# Patient Record
Sex: MALE | Race: WHITE | NOT HISPANIC OR LATINO | Employment: FULL TIME | URBAN - METROPOLITAN AREA
[De-identification: names, ages, dates, MRNs, and addresses within clinical notes are randomized per-mention and may not be internally consistent; named-entity substitution may affect disease eponyms.]

---

## 2019-10-23 ENCOUNTER — OFFICE VISIT (OUTPATIENT)
Dept: INTERNAL MEDICINE | Facility: CLINIC | Age: 24
End: 2019-10-23
Payer: COMMERCIAL

## 2019-10-23 VITALS
OXYGEN SATURATION: 97 % | DIASTOLIC BLOOD PRESSURE: 82 MMHG | BODY MASS INDEX: 22.92 KG/M2 | SYSTOLIC BLOOD PRESSURE: 134 MMHG | HEART RATE: 103 BPM | HEIGHT: 69 IN | TEMPERATURE: 98 F | WEIGHT: 154.75 LBS

## 2019-10-23 DIAGNOSIS — R10.9 ACUTE RIGHT FLANK PAIN: Primary | ICD-10-CM

## 2019-10-23 PROCEDURE — 99999 PR PBB SHADOW E&M-NEW PATIENT-LVL III: ICD-10-PCS | Mod: PBBFAC,,, | Performed by: FAMILY MEDICINE

## 2019-10-23 PROCEDURE — 99204 OFFICE O/P NEW MOD 45 MIN: CPT | Mod: S$GLB,,, | Performed by: FAMILY MEDICINE

## 2019-10-23 PROCEDURE — 99204 PR OFFICE/OUTPT VISIT, NEW, LEVL IV, 45-59 MIN: ICD-10-PCS | Mod: S$GLB,,, | Performed by: FAMILY MEDICINE

## 2019-10-23 PROCEDURE — 99999 PR PBB SHADOW E&M-NEW PATIENT-LVL III: CPT | Mod: PBBFAC,,, | Performed by: FAMILY MEDICINE

## 2019-10-23 RX ORDER — HYDROCODONE BITARTRATE AND ACETAMINOPHEN 10; 325 MG/1; MG/1
1 TABLET ORAL
COMMUNITY
End: 2019-10-23

## 2019-10-23 RX ORDER — IBUPROFEN 800 MG/1
800 TABLET ORAL 3 TIMES DAILY
Qty: 30 TABLET | Refills: 0 | Status: SHIPPED | OUTPATIENT
Start: 2019-10-23 | End: 2019-11-02

## 2019-10-23 RX ORDER — CYCLOBENZAPRINE HCL 10 MG
10 TABLET ORAL 3 TIMES DAILY
Qty: 30 TABLET | Refills: 0 | Status: SHIPPED | OUTPATIENT
Start: 2019-10-23 | End: 2019-11-06

## 2019-10-23 NOTE — PROGRESS NOTES
Subjective:   Patient ID:  Be Brito is a 24 y.o. male.    Chief Complaint:  Low-back Pain    History reviewed. No pertinent past medical history.     Past Surgical History:   Procedure Laterality Date    TONSILLECTOMY       Family History   Problem Relation Age of Onset    Lung cancer Mother     Leukemia Father     Osteoporosis Father     Arthritis Father     Colon cancer Neg Hx     Prostate cancer Neg Hx      Review of patient's allergies indicates:  No Known Allergies    Current Outpatient Medications:     cyclobenzaprine (FLEXERIL) 10 MG tablet, Take 1 tablet (10 mg total) by mouth 3 (three) times daily., Disp: 30 tablet, Rfl: 0    ibuprofen (ADVIL,MOTRIN) 800 MG tablet, Take 1 tablet (800 mg total) by mouth 3 (three) times daily. for 10 days, Disp: 30 tablet, Rfl: 0     Flank Pain   This is a new problem. The current episode started in the past 7 days. The problem occurs constantly. The problem has been gradually improving since onset. The pain is present in the costovertebral angle. The quality of the pain is described as cramping, shooting and stabbing. The pain does not radiate. Pain scale: Initial 9/10, now 4/10. The pain is mild. The pain is the same all the time. The symptoms are aggravated by bending, position, lying down and twisting. Stiffness is present: None. Pertinent negatives include no abdominal pain, bladder incontinence, bowel incontinence, chest pain, dysuria, fever, headaches, leg pain, numbness, paresis, paresthesias, pelvic pain, perianal numbness, tingling, weakness or weight loss. Risk factors: None. He has tried bed rest and home exercises (Tylenol) for the symptoms. The treatment provided mild relief.     Review of Systems   Constitutional: Negative for chills, fatigue, fever and weight loss.   Cardiovascular: Negative for chest pain, palpitations and leg swelling.   Gastrointestinal: Negative for abdominal pain, bowel incontinence, constipation, diarrhea, nausea and  "vomiting.   Genitourinary: Positive for flank pain. Negative for bladder incontinence, decreased urine volume, difficulty urinating, dysuria, frequency, hematuria, pelvic pain and urgency.   Musculoskeletal: Positive for back pain and myalgias. Negative for arthralgias, gait problem, joint swelling, neck pain and neck stiffness.   Skin: Negative for rash.   Neurological: Negative for dizziness, tingling, tremors, syncope, weakness, light-headedness, numbness, headaches and paresthesias.   Psychiatric/Behavioral: Negative for sleep disturbance. The patient is not nervous/anxious.        Objective:   /82 (BP Location: Right arm, Patient Position: Sitting, BP Method: Medium (Manual))   Pulse 103   Temp 97.7 °F (36.5 °C) (Tympanic)   Ht 5' 9" (1.753 m)   Wt 70.2 kg (154 lb 12.2 oz)   SpO2 97%   BMI 22.85 kg/m²     Physical Exam   Constitutional: Vital signs are normal. He appears well-developed and well-nourished. No distress.   Eyes: No scleral icterus.   Neck: Normal range of motion and full passive range of motion without pain. Neck supple.   Abdominal: Soft. He exhibits no distension. There is no tenderness. There is no rebound, no guarding and no CVA tenderness.   Musculoskeletal: He exhibits no edema.        Lumbar back: He exhibits pain and spasm. He exhibits normal range of motion, no tenderness, no bony tenderness, no swelling, no edema, no deformity and no laceration.   Full range of motion all fields.    Decreased speed due to pain.    No midline tenderness.    Pain located right flank area but above iliac crest.    Pain increased with palpation.  Pain greatest with lateral flexion and lateral rotation movements.   Neurological: He has normal reflexes. He displays a negative Romberg sign. Coordination and gait normal.   Straight leg raise negative x2   Skin: Skin is warm, dry and intact. No rash noted.   Psychiatric: He has a normal mood and affect.   Nursing note and vitals " reviewed.    Assessment:     1. Acute right flank pain      Plan:   Acute right flank pain  -     cyclobenzaprine (FLEXERIL) 10 MG tablet; Take 1 tablet (10 mg total) by mouth 3 (three) times daily.  Dispense: 30 tablet; Refill: 0  -     ibuprofen (ADVIL,MOTRIN) 800 MG tablet; Take 1 tablet (800 mg total) by mouth 3 (three) times daily. for 10 days  Dispense: 30 tablet; Refill: 0  -     Urinalysis; Future; Expected date: 10/23/2019    Check urinalysis.  If hematuria present, abdominal/pelvic CT stone protocol.    Most like soft tissue/muscle related.    Motrin inter mg 3 times a day  Flexeril 10 mg 3 times   Today than nightly on days that works.  Alternate ice or heat to affected area.    Avoid trigger activities.    Expect significant improvement in 47 terms with treatment.    If no improvement, call for x-rays and physical therapy.    Otherwise follow-up 2 weeks for annual physical exam.

## 2019-10-24 ENCOUNTER — TELEPHONE (OUTPATIENT)
Dept: INTERNAL MEDICINE | Facility: CLINIC | Age: 24
End: 2019-10-24

## 2019-10-24 NOTE — TELEPHONE ENCOUNTER
----- Message from Moises Pathak MD sent at 10/24/2019 11:49 AM CDT -----  Urinalysis normal.    Occult blood on urine dipstick only significant for 1+ or greater.  Would not order CT for kidney stones at this time.    If pain worsens or does not improve with treatment for musculoskeletal source, would then order CT abdomen pelvis with stone protocol.

## 2019-10-25 ENCOUNTER — TELEPHONE (OUTPATIENT)
Dept: INTERNAL MEDICINE | Facility: CLINIC | Age: 24
End: 2019-10-25

## 2019-10-25 NOTE — TELEPHONE ENCOUNTER
----- Message from Feliciano Jamil sent at 10/24/2019  4:39 PM CDT -----  Contact: pt   .Type:  Patient Returning Call    Who Called: pt   Who Left Message for Patient: nurse   Does the patient know what this is regarding?yes test results   Would the patient rather a call back or a response via [facility]? #callback   Best Call Back Number: ..[phone]    Additional Information:

## 2019-11-06 ENCOUNTER — LAB VISIT (OUTPATIENT)
Dept: LAB | Facility: HOSPITAL | Age: 24
End: 2019-11-06
Attending: FAMILY MEDICINE
Payer: COMMERCIAL

## 2019-11-06 ENCOUNTER — OFFICE VISIT (OUTPATIENT)
Dept: INTERNAL MEDICINE | Facility: CLINIC | Age: 24
End: 2019-11-06
Payer: COMMERCIAL

## 2019-11-06 VITALS
HEART RATE: 110 BPM | WEIGHT: 156.06 LBS | TEMPERATURE: 98 F | SYSTOLIC BLOOD PRESSURE: 122 MMHG | OXYGEN SATURATION: 97 % | BODY MASS INDEX: 23.12 KG/M2 | HEIGHT: 69 IN | DIASTOLIC BLOOD PRESSURE: 88 MMHG

## 2019-11-06 DIAGNOSIS — Z00.00 ANNUAL PHYSICAL EXAM: ICD-10-CM

## 2019-11-06 DIAGNOSIS — Z11.3 SCREENING FOR STDS (SEXUALLY TRANSMITTED DISEASES): ICD-10-CM

## 2019-11-06 DIAGNOSIS — Z00.00 ANNUAL PHYSICAL EXAM: Primary | ICD-10-CM

## 2019-11-06 LAB
ALBUMIN SERPL BCP-MCNC: 4.3 G/DL (ref 3.5–5.2)
ALP SERPL-CCNC: 63 U/L (ref 55–135)
ALT SERPL W/O P-5'-P-CCNC: 9 U/L (ref 10–44)
ANION GAP SERPL CALC-SCNC: 9 MMOL/L (ref 8–16)
AST SERPL-CCNC: 16 U/L (ref 10–40)
BILIRUB SERPL-MCNC: 0.8 MG/DL (ref 0.1–1)
BUN SERPL-MCNC: 13 MG/DL (ref 6–20)
CALCIUM SERPL-MCNC: 9.7 MG/DL (ref 8.7–10.5)
CHLORIDE SERPL-SCNC: 104 MMOL/L (ref 95–110)
CHOLEST SERPL-MCNC: 161 MG/DL (ref 120–199)
CHOLEST/HDLC SERPL: 2.5 {RATIO} (ref 2–5)
CO2 SERPL-SCNC: 28 MMOL/L (ref 23–29)
CREAT SERPL-MCNC: 0.9 MG/DL (ref 0.5–1.4)
ERYTHROCYTE [DISTWIDTH] IN BLOOD BY AUTOMATED COUNT: 12.1 % (ref 11.5–14.5)
EST. GFR  (AFRICAN AMERICAN): >60 ML/MIN/1.73 M^2
EST. GFR  (NON AFRICAN AMERICAN): >60 ML/MIN/1.73 M^2
GLUCOSE SERPL-MCNC: 81 MG/DL (ref 70–110)
HCT VFR BLD AUTO: 43.4 % (ref 40–54)
HDLC SERPL-MCNC: 65 MG/DL (ref 40–75)
HDLC SERPL: 40.4 % (ref 20–50)
HGB BLD-MCNC: 13.8 G/DL (ref 14–18)
LDLC SERPL CALC-MCNC: 89 MG/DL (ref 63–159)
MCH RBC QN AUTO: 30.7 PG (ref 27–31)
MCHC RBC AUTO-ENTMCNC: 31.8 G/DL (ref 32–36)
MCV RBC AUTO: 96 FL (ref 82–98)
NONHDLC SERPL-MCNC: 96 MG/DL
PLATELET # BLD AUTO: 299 K/UL (ref 150–350)
PMV BLD AUTO: 9.8 FL (ref 9.2–12.9)
POTASSIUM SERPL-SCNC: 4.4 MMOL/L (ref 3.5–5.1)
PROT SERPL-MCNC: 7.7 G/DL (ref 6–8.4)
RBC # BLD AUTO: 4.5 M/UL (ref 4.6–6.2)
SODIUM SERPL-SCNC: 141 MMOL/L (ref 136–145)
TRIGL SERPL-MCNC: 35 MG/DL (ref 30–150)
TSH SERPL DL<=0.005 MIU/L-ACNC: 0.82 UIU/ML (ref 0.4–4)
WBC # BLD AUTO: 4.64 K/UL (ref 3.9–12.7)

## 2019-11-06 PROCEDURE — 99999 PR PBB SHADOW E&M-EST. PATIENT-LVL III: CPT | Mod: PBBFAC,,, | Performed by: FAMILY MEDICINE

## 2019-11-06 PROCEDURE — 80061 LIPID PANEL: CPT

## 2019-11-06 PROCEDURE — 85027 COMPLETE CBC AUTOMATED: CPT

## 2019-11-06 PROCEDURE — 87340 HEPATITIS B SURFACE AG IA: CPT

## 2019-11-06 PROCEDURE — 84443 ASSAY THYROID STIM HORMONE: CPT

## 2019-11-06 PROCEDURE — 86803 HEPATITIS C AB TEST: CPT

## 2019-11-06 PROCEDURE — 99999 PR PBB SHADOW E&M-EST. PATIENT-LVL III: ICD-10-PCS | Mod: PBBFAC,,, | Performed by: FAMILY MEDICINE

## 2019-11-06 PROCEDURE — 86703 HIV-1/HIV-2 1 RESULT ANTBDY: CPT

## 2019-11-06 PROCEDURE — 99395 PR PREVENTIVE VISIT,EST,18-39: ICD-10-PCS | Mod: S$GLB,,, | Performed by: FAMILY MEDICINE

## 2019-11-06 PROCEDURE — 80053 COMPREHEN METABOLIC PANEL: CPT

## 2019-11-06 PROCEDURE — 99395 PREV VISIT EST AGE 18-39: CPT | Mod: S$GLB,,, | Performed by: FAMILY MEDICINE

## 2019-11-06 PROCEDURE — 86592 SYPHILIS TEST NON-TREP QUAL: CPT

## 2019-11-06 PROCEDURE — 86706 HEP B SURFACE ANTIBODY: CPT

## 2019-11-06 PROCEDURE — 36415 COLL VENOUS BLD VENIPUNCTURE: CPT

## 2019-11-06 NOTE — PROGRESS NOTES
"Subjective:   Patient ID: Be Brito is a 24 y.o. male.  Chief Complaint:  Follow-up      Patient presents for annual physical exam   Most recent visit for low back pain, leg cramps, muscle spasms.  Urinalysis normal.  Responded well to Motrin and Flexeril.  Pain-free today.    No family history colon or prostate cancer  No active  symptoms   Unknown last tetanus booster and flu vaccine   Smoker.  Not interest in cessation.  No previous pneumonia vaccine.  Request STD screening.  Denies previous exposure, diagnosis, or treatment.  No active symptoms.    No new complaints or concerns today.    Review of Systems   Constitutional: Negative for chills, fatigue and fever.   HENT: Negative for congestion, ear pain, postnasal drip, rhinorrhea, sinus pressure and sore throat.    Eyes: Negative for visual disturbance.   Respiratory: Negative for cough, chest tightness, shortness of breath and wheezing.    Cardiovascular: Negative for chest pain, palpitations and leg swelling.   Gastrointestinal: Negative for abdominal pain, constipation, diarrhea, nausea and vomiting.   Endocrine: Negative for polydipsia, polyphagia and polyuria.   Genitourinary: Negative for difficulty urinating, dysuria, flank pain, frequency, hematuria and urgency.   Musculoskeletal: Positive for back pain and myalgias.   Skin: Negative for rash.   Neurological: Negative for dizziness, weakness, light-headedness and headaches.   Hematological: Negative for adenopathy.   Psychiatric/Behavioral: Negative for sleep disturbance. The patient is not nervous/anxious.      Objective:   /88 (BP Location: Left arm, Patient Position: Sitting, BP Method: Medium (Manual))   Pulse 110   Temp 97.7 °F (36.5 °C) (Tympanic)   Ht 5' 9" (1.753 m)   Wt 70.8 kg (156 lb 1.4 oz)   SpO2 97%   BMI 23.05 kg/m²     Physical Exam   Constitutional: He is oriented to person, place, and time. Vital signs are normal. He appears well-developed and well-nourished. "   HENT:   Right Ear: Hearing, tympanic membrane, external ear and ear canal normal.   Left Ear: Hearing, tympanic membrane, external ear and ear canal normal.   Nose: Nose normal. Right sinus exhibits no maxillary sinus tenderness and no frontal sinus tenderness. Left sinus exhibits no maxillary sinus tenderness and no frontal sinus tenderness.   Mouth/Throat: Uvula is midline, oropharynx is clear and moist and mucous membranes are normal.   Eyes: Conjunctivae are normal. Right eye exhibits no discharge. Left eye exhibits no discharge. Right conjunctiva is not injected. Left conjunctiva is not injected. No scleral icterus.   Neck: No JVD present. No thyroid mass and no thyromegaly present.   Cardiovascular: Normal rate, regular rhythm, normal heart sounds and intact distal pulses. Exam reveals no gallop and no friction rub.   No murmur heard.  Pulses:       Radial pulses are 2+ on the right side, and 2+ on the left side.   Pulmonary/Chest: Effort normal and breath sounds normal. He has no wheezes. He has no rhonchi. He has no rales.   Abdominal: Soft. He exhibits no distension. There is no tenderness. There is no rebound, no guarding and no CVA tenderness.   Musculoskeletal: He exhibits no edema.        Lumbar back: Normal. He exhibits normal range of motion, no bony tenderness, no pain and no spasm.   Lymphadenopathy:     He has no cervical adenopathy.   Neurological: He is alert and oriented to person, place, and time.   Skin: Skin is warm and dry. No rash noted.   Psychiatric: He has a normal mood and affect.   Nursing note and vitals reviewed.    Assessment:       ICD-10-CM ICD-9-CM   1. Annual physical exam Z00.00 V70.0   2. Screening for STDs (sexually transmitted diseases) Z11.3 V74.5     Plan:   Annual physical exam  Screening for STDs (sexually transmitted diseases)  -     CBC Without Differential; Future; Expected date: 11/06/2019  -     Comprehensive metabolic panel; Future; Expected date: 11/06/2019  -      Lipid panel; Future; Expected date: 11/06/2019  -     TSH; Future; Expected date: 11/06/2019  -     HIV 1/2 Ag/Ab (4th Gen); Future; Expected date: 11/06/2019  -     RPR; Future; Expected date: 11/06/2019  -     Hepatitis B surface antigen; Future; Expected date: 11/06/2019  -     Hepatitis B surface antibody; Future; Expected date: 11/06/2019  -     Hepatitis C antibody; Future; Expected date: 11/06/2019  -     C. trachomatis/N. gonorrhoeae by AMP DNA; Future; Expected date: 11/06/2019    Blood pressure normal.  BMI 23.  Overall exam stable.    Check labs.  Treat as indicated.    Declines tetanus, shingles, and pneumonia vaccines.    Not interested in smoking cessation  Continue Motrin and/or Flexeril as needed for back pain.    Stay hydrated to avoid muscle cramps  If becomes more symptomatic, needs lumbar sacral x-ray.    Return to clinic 1 year for annual physical exam or sooner as needed.

## 2019-11-07 ENCOUNTER — TELEPHONE (OUTPATIENT)
Dept: INTERNAL MEDICINE | Facility: CLINIC | Age: 24
End: 2019-11-07

## 2019-11-07 LAB
HBV SURFACE AB SER-ACNC: NEGATIVE M[IU]/ML
HBV SURFACE AG SERPL QL IA: NEGATIVE
HCV AB SERPL QL IA: NEGATIVE
HIV 1+2 AB+HIV1 P24 AG SERPL QL IA: NEGATIVE
RPR SER QL: NORMAL

## 2019-11-07 NOTE — TELEPHONE ENCOUNTER
----- Message from Moises Pathak MD sent at 11/7/2019  8:23 AM CST -----  CBC test shows a mild anemia. Take a daily multivitamin with iron.     Sugar, Kidney, Liver, and Electrolyte tests are all normal.  Cholesterol tests are normal. Your 10-year risk of a heart disease or stroke is low. Aspirin daily is not recommended. A statin cholesterol medication is not recommended.  Thyroid testing is normal.  STD tests pending  Recheck labs 1 year

## 2020-12-07 ENCOUNTER — TELEPHONE (OUTPATIENT)
Dept: INTERNAL MEDICINE | Facility: CLINIC | Age: 25
End: 2020-12-07

## 2020-12-07 NOTE — TELEPHONE ENCOUNTER
I called and informed the pt that the provider has called out sick and I assisted him with rescheduling . He verbalized understanding. //kah

## 2021-03-25 ENCOUNTER — PATIENT MESSAGE (OUTPATIENT)
Dept: ADMINISTRATIVE | Facility: HOSPITAL | Age: 26
End: 2021-03-25

## 2022-04-27 ENCOUNTER — PATIENT MESSAGE (OUTPATIENT)
Dept: ADMINISTRATIVE | Facility: HOSPITAL | Age: 27
End: 2022-04-27
Payer: COMMERCIAL

## 2022-07-11 ENCOUNTER — LAB VISIT (OUTPATIENT)
Dept: LAB | Facility: HOSPITAL | Age: 27
End: 2022-07-11
Attending: FAMILY MEDICINE
Payer: COMMERCIAL

## 2022-07-11 ENCOUNTER — OFFICE VISIT (OUTPATIENT)
Dept: INTERNAL MEDICINE | Facility: CLINIC | Age: 27
End: 2022-07-11
Payer: COMMERCIAL

## 2022-07-11 VITALS
WEIGHT: 154.88 LBS | HEIGHT: 69 IN | SYSTOLIC BLOOD PRESSURE: 128 MMHG | DIASTOLIC BLOOD PRESSURE: 84 MMHG | HEART RATE: 86 BPM | TEMPERATURE: 99 F | OXYGEN SATURATION: 98 % | BODY MASS INDEX: 22.94 KG/M2

## 2022-07-11 DIAGNOSIS — M25.50 ARTHRALGIA, UNSPECIFIED JOINT: Primary | ICD-10-CM

## 2022-07-11 DIAGNOSIS — M25.50 ARTHRALGIA, UNSPECIFIED JOINT: ICD-10-CM

## 2022-07-11 LAB
ALBUMIN SERPL BCP-MCNC: 4.1 G/DL (ref 3.5–5.2)
ALP SERPL-CCNC: 55 U/L (ref 55–135)
ALT SERPL W/O P-5'-P-CCNC: 10 U/L (ref 10–44)
ANION GAP SERPL CALC-SCNC: 11 MMOL/L (ref 8–16)
AST SERPL-CCNC: 16 U/L (ref 10–40)
BILIRUB SERPL-MCNC: 1.1 MG/DL (ref 0.1–1)
BUN SERPL-MCNC: 11 MG/DL (ref 6–20)
CALCIUM SERPL-MCNC: 9.7 MG/DL (ref 8.7–10.5)
CHLORIDE SERPL-SCNC: 104 MMOL/L (ref 95–110)
CO2 SERPL-SCNC: 24 MMOL/L (ref 23–29)
CREAT SERPL-MCNC: 1 MG/DL (ref 0.5–1.4)
ERYTHROCYTE [SEDIMENTATION RATE] IN BLOOD BY WESTERGREN METHOD: 24 MM/HR (ref 0–10)
EST. GFR  (AFRICAN AMERICAN): >60 ML/MIN/1.73 M^2
EST. GFR  (NON AFRICAN AMERICAN): >60 ML/MIN/1.73 M^2
GLUCOSE SERPL-MCNC: 97 MG/DL (ref 70–110)
POTASSIUM SERPL-SCNC: 4.4 MMOL/L (ref 3.5–5.1)
PROT SERPL-MCNC: 7.2 G/DL (ref 6–8.4)
RHEUMATOID FACT SERPL-ACNC: 340 IU/ML (ref 0–15)
SODIUM SERPL-SCNC: 139 MMOL/L (ref 136–145)

## 2022-07-11 PROCEDURE — 1159F PR MEDICATION LIST DOCUMENTED IN MEDICAL RECORD: ICD-10-PCS | Mod: CPTII,S$GLB,, | Performed by: FAMILY MEDICINE

## 2022-07-11 PROCEDURE — 3074F SYST BP LT 130 MM HG: CPT | Mod: CPTII,S$GLB,, | Performed by: FAMILY MEDICINE

## 2022-07-11 PROCEDURE — 3079F DIAST BP 80-89 MM HG: CPT | Mod: CPTII,S$GLB,, | Performed by: FAMILY MEDICINE

## 2022-07-11 PROCEDURE — 86431 RHEUMATOID FACTOR QUANT: CPT | Performed by: FAMILY MEDICINE

## 2022-07-11 PROCEDURE — 85027 COMPLETE CBC AUTOMATED: CPT | Performed by: FAMILY MEDICINE

## 2022-07-11 PROCEDURE — 1160F PR REVIEW ALL MEDS BY PRESCRIBER/CLIN PHARMACIST DOCUMENTED: ICD-10-PCS | Mod: CPTII,S$GLB,, | Performed by: FAMILY MEDICINE

## 2022-07-11 PROCEDURE — 99999 PR PBB SHADOW E&M-EST. PATIENT-LVL III: CPT | Mod: PBBFAC,,, | Performed by: FAMILY MEDICINE

## 2022-07-11 PROCEDURE — 3008F PR BODY MASS INDEX (BMI) DOCUMENTED: ICD-10-PCS | Mod: CPTII,S$GLB,, | Performed by: FAMILY MEDICINE

## 2022-07-11 PROCEDURE — 1159F MED LIST DOCD IN RCRD: CPT | Mod: CPTII,S$GLB,, | Performed by: FAMILY MEDICINE

## 2022-07-11 PROCEDURE — 3074F PR MOST RECENT SYSTOLIC BLOOD PRESSURE < 130 MM HG: ICD-10-PCS | Mod: CPTII,S$GLB,, | Performed by: FAMILY MEDICINE

## 2022-07-11 PROCEDURE — 99214 PR OFFICE/OUTPT VISIT, EST, LEVL IV, 30-39 MIN: ICD-10-PCS | Mod: S$GLB,,, | Performed by: FAMILY MEDICINE

## 2022-07-11 PROCEDURE — 82550 ASSAY OF CK (CPK): CPT | Performed by: FAMILY MEDICINE

## 2022-07-11 PROCEDURE — 99214 OFFICE O/P EST MOD 30 MIN: CPT | Mod: S$GLB,,, | Performed by: FAMILY MEDICINE

## 2022-07-11 PROCEDURE — 1160F RVW MEDS BY RX/DR IN RCRD: CPT | Mod: CPTII,S$GLB,, | Performed by: FAMILY MEDICINE

## 2022-07-11 PROCEDURE — 80053 COMPREHEN METABOLIC PANEL: CPT | Performed by: FAMILY MEDICINE

## 2022-07-11 PROCEDURE — 99999 PR PBB SHADOW E&M-EST. PATIENT-LVL III: ICD-10-PCS | Mod: PBBFAC,,, | Performed by: FAMILY MEDICINE

## 2022-07-11 PROCEDURE — 86038 ANTINUCLEAR ANTIBODIES: CPT | Performed by: FAMILY MEDICINE

## 2022-07-11 PROCEDURE — 3079F PR MOST RECENT DIASTOLIC BLOOD PRESSURE 80-89 MM HG: ICD-10-PCS | Mod: CPTII,S$GLB,, | Performed by: FAMILY MEDICINE

## 2022-07-11 PROCEDURE — 3008F BODY MASS INDEX DOCD: CPT | Mod: CPTII,S$GLB,, | Performed by: FAMILY MEDICINE

## 2022-07-11 PROCEDURE — 36415 COLL VENOUS BLD VENIPUNCTURE: CPT | Performed by: FAMILY MEDICINE

## 2022-07-11 PROCEDURE — 85651 RBC SED RATE NONAUTOMATED: CPT | Performed by: FAMILY MEDICINE

## 2022-07-11 NOTE — PROGRESS NOTES
Be Brito  07/11/2022  [id number]    Moises Pathak MD  Patient Care Team:  Moises Pathak MD as PCP - General (Family Medicine)          Visit Type:an urgent visit for a new problem    Chief Complaint:  Chief Complaint   Patient presents with    Annual Exam     Joint and muscle pain for awhile now       History of Present Illness:  This patient is new to me  PCP listed as Moises Pathak at the [place].    He comes in today with concerns of soreness and swelling.  He has concerns with joint pain for while  He reports it feels migratory. He can report move to shoulder, and had joint.  Feels a muscle burning. He feels that the joint can get swollen and hot and red.       Last Check up 2019.  C/o Back pain. Responded with motrin and flexeril    No recent labs.    Smoker- not interested in quitting at this time.    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  Reports that the issues are 7 yrs ongoing    He is reporting now that even stretching and getting going isn't helping the pain.         History:  History reviewed. No pertinent past medical history.  Past Surgical History:   Procedure Laterality Date    TONSILLECTOMY      trigger finger injection       Family History   Problem Relation Age of Onset    Lung cancer Mother     Leukemia Father     Osteoporosis Father     Arthritis Father     Colon cancer Neg Hx     Prostate cancer Neg Hx      Social History     Socioeconomic History    Marital status: Single    Number of children: 0   Tobacco Use    Smoking status: Current Every Day Smoker     Packs/day: 1.00     Types: Cigarettes    Smokeless tobacco: Current User   Substance and Sexual Activity    Alcohol use: Yes     Alcohol/week: 25.0 standard drinks     Types: 25 Cans of beer per week    Drug use: Never     There is no problem list on file for this patient.    Review of patient's allergies indicates:  No Known Allergies    The following were reviewed at this visit: active problem list,  medication list, allergies, family history, social history, and health maintenance.    Medications:  Current Outpatient Medications on File Prior to Visit   Medication Sig Dispense Refill    acetaminophen (TYLENOL ORAL) Take by mouth.       No current facility-administered medications on file prior to visit.       Medications have been reviewed and reconciled with patient at this visit.  Barriers to medications reviewed with patient.    Adverse reactions to current medications reviewed with patient..    Over the counter medications reviewed and reconciled with patient.    Exam:  Wt Readings from Last 3 Encounters:   07/11/22 70.2 kg (154 lb 14 oz)   11/06/19 70.8 kg (156 lb 1.4 oz)   10/23/19 70.2 kg (154 lb 12.2 oz)     Temp Readings from Last 3 Encounters:   07/11/22 98.8 °F (37.1 °C) (Temporal)   11/06/19 97.7 °F (36.5 °C) (Tympanic)   10/23/19 97.7 °F (36.5 °C) (Tympanic)     BP Readings from Last 3 Encounters:   07/11/22 128/84   11/06/19 122/88   10/23/19 134/82     Pulse Readings from Last 3 Encounters:   07/11/22 86   11/06/19 110   10/23/19 103     Body mass index is 22.87 kg/m².      Review of Systems   Constitutional: Negative.  Negative for chills and fever.   HENT: Negative.  Negative for congestion, sinus pain and sore throat.    Eyes: Negative for blurred vision and double vision.   Respiratory: Negative for cough, sputum production, shortness of breath and wheezing.    Cardiovascular: Negative for chest pain, palpitations and leg swelling.   Gastrointestinal: Negative for abdominal pain, constipation, diarrhea, heartburn, nausea and vomiting.   Genitourinary: Negative.    Musculoskeletal: Positive for joint pain.   Skin: Negative.  Negative for rash.   Neurological: Negative.    Endo/Heme/Allergies: Negative.  Negative for polydipsia. Does not bruise/bleed easily.   Psychiatric/Behavioral: Negative for depression and substance abuse.     Physical Exam  Nursing note reviewed.   Cardiovascular:       Rate and Rhythm: Normal rate and regular rhythm.   Pulmonary:      Effort: Pulmonary effort is normal. No respiratory distress.   Musculoskeletal:      Comments: Right bicep  Right  finger joint, pain, worse with     No skin changes.  Doesn't appear red     Neurological:      Mental Status: He is alert and oriented to person, place, and time.   Psychiatric:         Mood and Affect: Mood normal.         Behavior: Behavior normal.         Thought Content: Thought content normal.         Judgment: Judgment normal.         Laboratory Reviewed ({Yes)  Lab Results   Component Value Date    WBC 4.64 11/06/2019    HGB 13.8 (L) 11/06/2019    HCT 43.4 11/06/2019     11/06/2019    CHOL 161 11/06/2019    TRIG 35 11/06/2019    HDL 65 11/06/2019    ALT 9 (L) 11/06/2019    AST 16 11/06/2019     11/06/2019    K 4.4 11/06/2019     11/06/2019    CREATININE 0.9 11/06/2019    BUN 13 11/06/2019    CO2 28 11/06/2019    TSH 0.824 11/06/2019       Be was seen today for annual exam.    Diagnoses and all orders for this visit:    Arthralgia, unspecified joint  -     Comprehensive Metabolic Panel; Future  -     CBC Without Differential; Future  -     Sedimentation rate; Future  -     Rheumatoid Factor; Future  -     TARIQ Screen w/Reflex; Future  -     CK; Future  -     CBC Auto Differential; Future      Check labs today    May need rheum follow up            Care Plan/Goals: Reviewed    Goals    None         Follow up: No follow-ups on file.    After visit summary was printed and given to patient upon discharge today.  Patient goals and care plan are included in After Visit Summary.

## 2022-07-12 DIAGNOSIS — M06.9 RHEUMATOID ARTHRITIS, INVOLVING UNSPECIFIED SITE, UNSPECIFIED WHETHER RHEUMATOID FACTOR PRESENT: Primary | ICD-10-CM

## 2022-07-12 LAB
ANA SER QL IF: NORMAL
CK SERPL-CCNC: 60 U/L (ref 20–200)
ERYTHROCYTE [DISTWIDTH] IN BLOOD BY AUTOMATED COUNT: 12.7 % (ref 11.5–14.5)
HCT VFR BLD AUTO: 44.1 % (ref 40–54)
HGB BLD-MCNC: 14.4 G/DL (ref 14–18)
MCH RBC QN AUTO: 31.2 PG (ref 27–31)
MCHC RBC AUTO-ENTMCNC: 32.7 G/DL (ref 32–36)
MCV RBC AUTO: 96 FL (ref 82–98)
PLATELET # BLD AUTO: 272 K/UL (ref 150–450)
PMV BLD AUTO: 9.9 FL (ref 9.2–12.9)
RBC # BLD AUTO: 4.61 M/UL (ref 4.6–6.2)
WBC # BLD AUTO: 5.83 K/UL (ref 3.9–12.7)

## 2022-07-21 ENCOUNTER — HOSPITAL ENCOUNTER (OUTPATIENT)
Dept: RADIOLOGY | Facility: HOSPITAL | Age: 27
Discharge: HOME | End: 2022-07-21
Payer: COMMERCIAL

## 2022-07-21 ENCOUNTER — OFFICE VISIT (OUTPATIENT)
Dept: RHEUMATOLOGY | Facility: CLINIC | Age: 27
End: 2022-07-21
Payer: COMMERCIAL

## 2022-07-21 ENCOUNTER — PATIENT MESSAGE (OUTPATIENT)
Dept: RHEUMATOLOGY | Facility: CLINIC | Age: 27
End: 2022-07-21

## 2022-07-21 VITALS
WEIGHT: 157.44 LBS | HEART RATE: 83 BPM | SYSTOLIC BLOOD PRESSURE: 129 MMHG | RESPIRATION RATE: 16 BRPM | DIASTOLIC BLOOD PRESSURE: 82 MMHG | BODY MASS INDEX: 23.32 KG/M2 | HEIGHT: 69 IN

## 2022-07-21 DIAGNOSIS — Z72.0 TOBACCO USE: ICD-10-CM

## 2022-07-21 DIAGNOSIS — Z82.62 FAMILY HISTORY OF OSTEOPOROSIS: ICD-10-CM

## 2022-07-21 DIAGNOSIS — M05.79 RHEUMATOID ARTHRITIS INVOLVING MULTIPLE SITES WITH POSITIVE RHEUMATOID FACTOR: Primary | ICD-10-CM

## 2022-07-21 DIAGNOSIS — M25.50 POLYARTHRALGIA: ICD-10-CM

## 2022-07-21 DIAGNOSIS — M05.79 RHEUMATOID ARTHRITIS INVOLVING MULTIPLE SITES WITH POSITIVE RHEUMATOID FACTOR: ICD-10-CM

## 2022-07-21 PROCEDURE — 99999 PR PBB SHADOW E&M-EST. PATIENT-LVL V: ICD-10-PCS | Mod: PBBFAC,,,

## 2022-07-21 PROCEDURE — 73130 X-RAY EXAM OF HAND: CPT | Mod: 26,,, | Performed by: RADIOLOGY

## 2022-07-21 PROCEDURE — 3079F PR MOST RECENT DIASTOLIC BLOOD PRESSURE 80-89 MM HG: ICD-10-PCS | Mod: CPTII,S$GLB,,

## 2022-07-21 PROCEDURE — 3074F SYST BP LT 130 MM HG: CPT | Mod: CPTII,S$GLB,,

## 2022-07-21 PROCEDURE — 99205 PR OFFICE/OUTPT VISIT, NEW, LEVL V, 60-74 MIN: ICD-10-PCS | Mod: S$GLB,,,

## 2022-07-21 PROCEDURE — 73630 X-RAY EXAM OF FOOT: CPT | Mod: TC,50

## 2022-07-21 PROCEDURE — 99999 PR PBB SHADOW E&M-EST. PATIENT-LVL V: CPT | Mod: PBBFAC,,,

## 2022-07-21 PROCEDURE — 1160F PR REVIEW ALL MEDS BY PRESCRIBER/CLIN PHARMACIST DOCUMENTED: ICD-10-PCS | Mod: CPTII,S$GLB,,

## 2022-07-21 PROCEDURE — 73630 XR FOOT COMPLETE 3 VIEW BILATERAL: ICD-10-PCS | Mod: 26,,, | Performed by: RADIOLOGY

## 2022-07-21 PROCEDURE — 1159F MED LIST DOCD IN RCRD: CPT | Mod: CPTII,S$GLB,,

## 2022-07-21 PROCEDURE — 73130 X-RAY EXAM OF HAND: CPT | Mod: TC,50

## 2022-07-21 PROCEDURE — 3008F PR BODY MASS INDEX (BMI) DOCUMENTED: ICD-10-PCS | Mod: CPTII,S$GLB,,

## 2022-07-21 PROCEDURE — 1159F PR MEDICATION LIST DOCUMENTED IN MEDICAL RECORD: ICD-10-PCS | Mod: CPTII,S$GLB,,

## 2022-07-21 PROCEDURE — 3008F BODY MASS INDEX DOCD: CPT | Mod: CPTII,S$GLB,,

## 2022-07-21 PROCEDURE — 3079F DIAST BP 80-89 MM HG: CPT | Mod: CPTII,S$GLB,,

## 2022-07-21 PROCEDURE — 99205 OFFICE O/P NEW HI 60 MIN: CPT | Mod: S$GLB,,,

## 2022-07-21 PROCEDURE — 3074F PR MOST RECENT SYSTOLIC BLOOD PRESSURE < 130 MM HG: ICD-10-PCS | Mod: CPTII,S$GLB,,

## 2022-07-21 PROCEDURE — 73630 X-RAY EXAM OF FOOT: CPT | Mod: 26,,, | Performed by: RADIOLOGY

## 2022-07-21 PROCEDURE — 73130 XR HAND COMPLETE 3 VIEWS BILATERAL: ICD-10-PCS | Mod: 26,,, | Performed by: RADIOLOGY

## 2022-07-21 PROCEDURE — 1160F RVW MEDS BY RX/DR IN RCRD: CPT | Mod: CPTII,S$GLB,,

## 2022-07-21 RX ORDER — PREDNISONE 5 MG/1
TABLET ORAL
Qty: 70 TABLET | Refills: 0 | Status: SHIPPED | OUTPATIENT
Start: 2022-07-21 | End: 2022-08-18

## 2022-07-21 RX ORDER — HYDROXYCHLOROQUINE SULFATE 200 MG/1
200 TABLET, FILM COATED ORAL DAILY
Qty: 90 TABLET | Refills: 1 | Status: SHIPPED | OUTPATIENT
Start: 2022-07-21 | End: 2022-10-20 | Stop reason: SDUPTHER

## 2022-07-21 NOTE — PROGRESS NOTES
RHEUMATOLOGY OUTPATIENT CLINIC NOTE    07/21/2022    Subjective:       Patient ID: Be Brito is a 27 y.o. male.    Chief Complaint: Joint Pain      HPI   Be Brito is a 27 y.o. pleasant male here for rheumatology initial evaluation. Referred for elevated RF.     He reports a many year (7 8) history of joint pain.  He does not like coming to the doctor's, and the pain which generally go away on its own.  However recently, the pain has been unrelenting.  Most notable in his fingers, knuckles, ankles, knees.  He reports swelling most notably in his MCPs, occasionally red during severe flares.  Also with difficulty with his  strength.  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  He reports stiffness lasting all day long.  Previously tried various over-the-counter analgesics including Tylenol, Aleve, ibuprofen without much relief.    No muscle weakness.   No fever, lymphadenopathy, no unexpected weight loss, no fatigue  No rashes on palms, no vasculitis  No shortness of breath or pleuritic chest pain.     Rheumatologic review of systems negative otherwise.     Fam hx:  Father with osteoporosis, arthritis?    Tobacco:  Current smoker pack a day.  Alcohol: 2 beers a day  Denies other illicit drugs.        History reviewed. No pertinent past medical history.  Past Surgical History:   Procedure Laterality Date    TONSILLECTOMY      trigger finger injection       Family History   Problem Relation Age of Onset    Lung cancer Mother     Leukemia Father     Osteoporosis Father     Arthritis Father     Colon cancer Neg Hx     Prostate cancer Neg Hx      Social History     Socioeconomic History    Marital status: Single    Number of children: 0   Tobacco Use    Smoking status: Current Every Day Smoker     Packs/day: 1.00     Types: Cigarettes    Smokeless tobacco: Current User   Substance and Sexual Activity    Alcohol use: Yes      "Alcohol/week: 25.0 standard drinks     Types: 25 Cans of beer per week    Drug use: Never     Review of patient's allergies indicates:  No Known Allergies        Objective:   /82   Pulse 83   Resp 16   Ht 5' 9" (1.753 m)   Wt 71.4 kg (157 lb 6.5 oz)   BMI 23.25 kg/m²     There is no immunization history on file for this patient.           Physical Exam   Constitutional: He appears well-developed. No distress.   HENT:   Head: Normocephalic and atraumatic.   Pulmonary/Chest: Effort normal. No respiratory distress.   Musculoskeletal:         General: Swelling and tenderness present. No deformity. Normal range of motion.      Comments: Synovitis, chronic synovial thickening most notable at 2nd and 3rd MCPs bilaterally.  Also some synovitis at multiple PIP is.  Decreased fist formation bilaterally.  Strength 5/5 bilateral upper and lower extremities.  Painful MTP squeeze   Neurological: He is alert.   Skin: Skin is warm. Capillary refill takes less than 2 seconds. No rash noted. He is not diaphoretic. No erythema.   Psychiatric: His behavior is normal. Judgment and thought content normal.   Vitals reviewed.          Recent Results (from the past 672 hour(s))   Comprehensive Metabolic Panel    Collection Time: 07/11/22 11:51 AM   Result Value Ref Range    Sodium 139 136 - 145 mmol/L    Potassium 4.4 3.5 - 5.1 mmol/L    Chloride 104 95 - 110 mmol/L    CO2 24 23 - 29 mmol/L    Glucose 97 70 - 110 mg/dL    BUN 11 6 - 20 mg/dL    Creatinine 1.0 0.5 - 1.4 mg/dL    Calcium 9.7 8.7 - 10.5 mg/dL    Total Protein 7.2 6.0 - 8.4 g/dL    Albumin 4.1 3.5 - 5.2 g/dL    Total Bilirubin 1.1 (H) 0.1 - 1.0 mg/dL    Alkaline Phosphatase 55 55 - 135 U/L    AST 16 10 - 40 U/L    ALT 10 10 - 44 U/L    Anion Gap 11 8 - 16 mmol/L    eGFR if African American >60.0 >60 mL/min/1.73 m^2    eGFR if non African American >60.0 >60 mL/min/1.73 m^2   CBC Without Differential    Collection Time: 07/11/22 11:51 AM   Result Value Ref Range    " WBC 5.83 3.90 - 12.70 K/uL    RBC 4.61 4.60 - 6.20 M/uL    Hemoglobin 14.4 14.0 - 18.0 g/dL    Hematocrit 44.1 40.0 - 54.0 %    MCV 96 82 - 98 fL    MCH 31.2 (H) 27.0 - 31.0 pg    MCHC 32.7 32.0 - 36.0 g/dL    RDW 12.7 11.5 - 14.5 %    Platelets 272 150 - 450 K/uL    MPV 9.9 9.2 - 12.9 fL   Sedimentation rate    Collection Time: 07/11/22 11:51 AM   Result Value Ref Range    Sed Rate 24 (H) 0 - 10 mm/Hr   Rheumatoid Factor    Collection Time: 07/11/22 11:51 AM   Result Value Ref Range    Rheumatoid Factor 340.0 (H) 0.0 - 15.0 IU/mL   TARIQ Screen w/Reflex    Collection Time: 07/11/22 11:51 AM   Result Value Ref Range    TARIQ Screen Negative <1:80 Negative <1:80   CK    Collection Time: 07/11/22 11:51 AM   Result Value Ref Range    CPK 60 20 - 200 U/L   C-Reactive Protein    Collection Time: 07/21/22  9:37 AM   Result Value Ref Range    CRP 6.4 0.0 - 8.2 mg/L        No results found for: TBGOLDPLUS   Lab Results   Component Value Date    HEPCAB Negative 11/06/2019      Bilateral hand x-ray 07/21/2022  No acute fracture or dislocation.  Joint spaces appear to be relatively well maintained.  No erosive changes are identified.    Bilateral foot x-ray 07/21/2022  FINDINGS:  No acute fracture or dislocation.  There appears to be some possible soft tissue swelling adjacent to either 5th metatarsal head.  No erosive changes are identified.  Joint spaces appear to be relatively well maintained.     Assessment:       1. Rheumatoid arthritis involving multiple sites with positive rheumatoid factor    2. Tobacco use    3. Polyarthralgia    4. Family history of osteoporosis          Impression:   Rheumatoid arthritis with positive rheumatoid factor and polyarthralgias  Rheumatoid factor 340, elevated sed rate at 24. Prolonged morning stiffness lasting all day, polyarthralgias, swelling multiple joints.  Synovitis present bilateral hands, painful MTP squeeze.  Previously tried over-the-counter analgesics.  Family history with some  form of arthritis in his father, also osteoporosis.  Denies interest in family planning at this time.  We discussed risks of fetal toxicity with certain immunosuppressant medications.  Buys patient to let us know if at any point while on immunosuppressant therapy if he and his spouse would be trying for family planning.    Tobacco use  Current smoker 1 pack a day  None interested in cessation    Family history osteoporosis  In father.  Also with patient with rheumatoid arthritis with med at increased risk for developing osteoporosis.    Plan:           Orders Placed This Encounter   Procedures    X-Ray Foot Complete Bilateral    X-Ray Hand 3 View Bilateral    Cyclic Citrullinated Peptide Antibody, IgG    C-Reactive Protein    Hepatitis B Surface Ab, Qualitative    Hepatitis B Core Antibody, Total    Hepatitis B Surface Antigen    Hepatitis C Antibody    Quantiferon Gold TB      Recommend stopping smoking    Bilateral hand and foot x-rays for baseline imaging today.    Above rheumatic workup.  Pre biologic studies in anticipation of possible future bilateral.    Will send prednisone taper trial starting 20 mg for 1 week, decreasing by 5 mg each week.  Discussed side effects and risk of medication including elevation in blood sugar, insomnia, weight gain, decrease in bone density    Start Plaquenil 200 mg daily.  If no improvement in 3 months, can increase to twice daily.  Discussed side effects and risk of medication with patient including GI upset, retinal toxicity.  Recommend baseline eye exam    Patient would prefer oral medication over injections as he is not comfortable with needles.    Discussed need for medication safety monitoring while on DMARD or other immunosuppressant.      Baseline labs and imaging today    Follow up 3-4 months with reg4    Vidhya Barragan PA-C  [facility]  Rheumatology     60 minutes of total time spent on the encounter, which includes face to  face time and non-face to face time preparing to see the patient (eg, review of tests), Obtaining and/or reviewing separately obtained history, Documenting clinical information in the electronic or other health record, Independently interpreting results (not separately reported) and communicating results to the patient/family/caregiver, or Care coordination (not separately reported).     CC note Lawanda Martinez MD

## 2022-08-24 ENCOUNTER — PATIENT MESSAGE (OUTPATIENT)
Dept: RHEUMATOLOGY | Facility: CLINIC | Age: 27
End: 2022-08-24
Payer: COMMERCIAL

## 2022-08-26 DIAGNOSIS — M05.79 RHEUMATOID ARTHRITIS INVOLVING MULTIPLE SITES WITH POSITIVE RHEUMATOID FACTOR: Primary | ICD-10-CM

## 2022-08-26 RX ORDER — PREDNISONE 5 MG/1
TABLET ORAL
Qty: 40 TABLET | Refills: 0 | Status: SHIPPED | OUTPATIENT
Start: 2022-08-26 | End: 2022-09-13

## 2022-08-26 NOTE — TELEPHONE ENCOUNTER
MD Paulino Moreno Staff 1 hour ago (6:10 AM)         Will send prednisone course to discontinue.  If refractory pain in 1 month, would suggest methotrexate addition to regimen and consider local corticosteroid injections.  Rheumatoid medications take several weeks to have a noticeable effect

## 2022-10-18 ENCOUNTER — PATIENT MESSAGE (OUTPATIENT)
Dept: RHEUMATOLOGY | Facility: CLINIC | Age: 27
End: 2022-10-18
Payer: COMMERCIAL

## 2022-10-21 ENCOUNTER — PATIENT MESSAGE (OUTPATIENT)
Dept: RHEUMATOLOGY | Facility: CLINIC | Age: 27
End: 2022-10-21
Payer: COMMERCIAL

## 2022-10-24 ENCOUNTER — TELEPHONE (OUTPATIENT)
Dept: RHEUMATOLOGY | Facility: CLINIC | Age: 27
End: 2022-10-24
Payer: COMMERCIAL

## 2022-10-24 DIAGNOSIS — M05.79 RHEUMATOID ARTHRITIS INVOLVING MULTIPLE SITES WITH POSITIVE RHEUMATOID FACTOR: Primary | ICD-10-CM

## 2022-10-25 ENCOUNTER — LAB VISIT (OUTPATIENT)
Dept: LAB | Facility: HOSPITAL | Age: 27
End: 2022-10-25
Payer: COMMERCIAL

## 2022-10-25 DIAGNOSIS — M05.79 RHEUMATOID ARTHRITIS INVOLVING MULTIPLE SITES WITH POSITIVE RHEUMATOID FACTOR: ICD-10-CM

## 2022-10-25 LAB
ALBUMIN SERPL BCP-MCNC: 4.2 G/DL (ref 3.5–5.2)
ALP SERPL-CCNC: 48 U/L (ref 55–135)
ALT SERPL W/O P-5'-P-CCNC: 8 U/L (ref 10–44)
ANION GAP SERPL CALC-SCNC: 10 MMOL/L (ref 8–16)
AST SERPL-CCNC: 18 U/L (ref 10–40)
BASOPHILS # BLD AUTO: 0.04 K/UL (ref 0–0.2)
BASOPHILS NFR BLD: 0.6 % (ref 0–1.9)
BILIRUB SERPL-MCNC: 0.5 MG/DL (ref 0.1–1)
BUN SERPL-MCNC: 10 MG/DL (ref 6–20)
CALCIUM SERPL-MCNC: 9.4 MG/DL (ref 8.7–10.5)
CHLORIDE SERPL-SCNC: 105 MMOL/L (ref 95–110)
CO2 SERPL-SCNC: 25 MMOL/L (ref 23–29)
CREAT SERPL-MCNC: 0.9 MG/DL (ref 0.5–1.4)
CRP SERPL-MCNC: 2.5 MG/L (ref 0–8.2)
DIFFERENTIAL METHOD: ABNORMAL
EOSINOPHIL # BLD AUTO: 0.2 K/UL (ref 0–0.5)
EOSINOPHIL NFR BLD: 2.5 % (ref 0–8)
ERYTHROCYTE [DISTWIDTH] IN BLOOD BY AUTOMATED COUNT: 12.1 % (ref 11.5–14.5)
ERYTHROCYTE [SEDIMENTATION RATE] IN BLOOD BY WESTERGREN METHOD: 8 MM/HR (ref 0–10)
EST. GFR  (NO RACE VARIABLE): >60 ML/MIN/1.73 M^2
GLUCOSE SERPL-MCNC: 97 MG/DL (ref 70–110)
HCT VFR BLD AUTO: 40.4 % (ref 40–54)
HGB BLD-MCNC: 13.9 G/DL (ref 14–18)
IMM GRANULOCYTES # BLD AUTO: 0.02 K/UL (ref 0–0.04)
IMM GRANULOCYTES NFR BLD AUTO: 0.3 % (ref 0–0.5)
LYMPHOCYTES # BLD AUTO: 1.8 K/UL (ref 1–4.8)
LYMPHOCYTES NFR BLD: 27.4 % (ref 18–48)
MCH RBC QN AUTO: 31 PG (ref 27–31)
MCHC RBC AUTO-ENTMCNC: 34.4 G/DL (ref 32–36)
MCV RBC AUTO: 90 FL (ref 82–98)
MONOCYTES # BLD AUTO: 0.4 K/UL (ref 0.3–1)
MONOCYTES NFR BLD: 6.7 % (ref 4–15)
NEUTROPHILS # BLD AUTO: 4 K/UL (ref 1.8–7.7)
NEUTROPHILS NFR BLD: 62.5 % (ref 38–73)
NRBC BLD-RTO: 0 /100 WBC
PLATELET # BLD AUTO: 267 K/UL (ref 150–450)
PMV BLD AUTO: 9.9 FL (ref 9.2–12.9)
POTASSIUM SERPL-SCNC: 3.9 MMOL/L (ref 3.5–5.1)
PROT SERPL-MCNC: 7.4 G/DL (ref 6–8.4)
RBC # BLD AUTO: 4.49 M/UL (ref 4.6–6.2)
SODIUM SERPL-SCNC: 140 MMOL/L (ref 136–145)
WBC # BLD AUTO: 6.38 K/UL (ref 3.9–12.7)

## 2022-10-25 PROCEDURE — 85651 RBC SED RATE NONAUTOMATED: CPT

## 2022-10-25 PROCEDURE — 86140 C-REACTIVE PROTEIN: CPT

## 2022-10-25 PROCEDURE — 80053 COMPREHEN METABOLIC PANEL: CPT

## 2022-10-25 PROCEDURE — 85025 COMPLETE CBC W/AUTO DIFF WBC: CPT

## 2022-10-25 PROCEDURE — 36415 COLL VENOUS BLD VENIPUNCTURE: CPT | Mod: PO

## 2022-10-27 NOTE — PROGRESS NOTES
RHEUMATOLOGY OUTPATIENT CLINIC NOTE    10/28/2022    Subjective:       Patient ID: Be Brito is a 27 y.o. male.    Chief Complaint: Rheumatoid Arthritis      HPI   Be Brito is a 27 y.o. pleasant male here for rheumatology initial evaluation. Referred for elevated RF.       He reports 80% improvement in his joint pain and stiffness since starting Plaquenil 200 mg once daily.  Still with some stiffness worse in the morning sometimes lasting all day.  Pain today 2/10 in his hands and feet.  Reports frequent swelling of multiple joints in his bilateral hands multiple times weekly.  Alternates various PIP joints.    He notices rash on his bilateral hands every time he opens and new pair of work gloves.  It improves after a few days of wearing the gloves.    No muscle weakness.   No fever, lymphadenopathy, no unexpected weight loss, no fatigue  No rashes on palms, no vasculitis  No shortness of breath or pleuritic chest pain.     Rheumatologic review of systems negative otherwise.     Physical exam No obvious synovitis, no erythema, no increased warmth to any joints benita UE/LE.   Painful MTP squeeze.      Initial HPI:  He reports a many year (7-8) history of joint pain.  He does not like coming to the doctor's, and the pain generally goes away on its own.  However recently, the pain has been unrelenting.  Most notable in his fingers, knuckles, ankles, knees.  He reports swelling most notably in his MCPs, occasionally red during severe flares.  Also with difficulty with his  strength.  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  He reports stiffness lasting all day long.  Previously tried various over-the-counter analgesics including Tylenol, Aleve, ibuprofen without much relief.    Fam hx:  Father with osteoporosis, arthritis?    Tobacco:  Current smoker pack a day.  Alcohol: 2 beers a day  Denies other illicit drugs.       History reviewed.  "No pertinent past medical history.  Past Surgical History:   Procedure Laterality Date    TONSILLECTOMY      trigger finger injection       Family History   Problem Relation Age of Onset    Lung cancer Mother     Leukemia Father     Osteoporosis Father     Arthritis Father     Colon cancer Neg Hx     Prostate cancer Neg Hx      Social History     Socioeconomic History    Marital status: Single    Number of children: 0   Tobacco Use    Smoking status: Every Day     Packs/day: 1.00     Types: Cigarettes    Smokeless tobacco: Current   Substance and Sexual Activity    Alcohol use: Yes     Alcohol/week: 25.0 standard drinks     Types: 25 Cans of beer per week    Drug use: Never     Review of patient's allergies indicates:  No Known Allergies        Objective:   BP (!) 137/94   Pulse 87   Ht 5' 9" (1.753 m)   Wt 73.9 kg (162 lb 14.7 oz)   BMI 24.06 kg/m²     There is no immunization history on file for this patient.               Recent Results (from the past 672 hour(s))   C-Reactive Protein    Collection Time: 10/25/22  3:15 PM   Result Value Ref Range    CRP 2.5 0.0 - 8.2 mg/L   CBC Auto Differential    Collection Time: 10/25/22  3:15 PM   Result Value Ref Range    WBC 6.38 3.90 - 12.70 K/uL    RBC 4.49 (L) 4.60 - 6.20 M/uL    Hemoglobin 13.9 (L) 14.0 - 18.0 g/dL    Hematocrit 40.4 40.0 - 54.0 %    MCV 90 82 - 98 fL    MCH 31.0 27.0 - 31.0 pg    MCHC 34.4 32.0 - 36.0 g/dL    RDW 12.1 11.5 - 14.5 %    Platelets 267 150 - 450 K/uL    MPV 9.9 9.2 - 12.9 fL    Immature Granulocytes 0.3 0.0 - 0.5 %    Gran # (ANC) 4.0 1.8 - 7.7 K/uL    Immature Grans (Abs) 0.02 0.00 - 0.04 K/uL    Lymph # 1.8 1.0 - 4.8 K/uL    Mono # 0.4 0.3 - 1.0 K/uL    Eos # 0.2 0.0 - 0.5 K/uL    Baso # 0.04 0.00 - 0.20 K/uL    nRBC 0 0 /100 WBC    Gran % 62.5 38.0 - 73.0 %    Lymph % 27.4 18.0 - 48.0 %    Mono % 6.7 4.0 - 15.0 %    Eosinophil % 2.5 0.0 - 8.0 %    Basophil % 0.6 0.0 - 1.9 %    Differential Method Automated    COMPREHENSIVE METABOLIC " PANEL    Collection Time: 10/25/22  3:15 PM   Result Value Ref Range    Sodium 140 136 - 145 mmol/L    Potassium 3.9 3.5 - 5.1 mmol/L    Chloride 105 95 - 110 mmol/L    CO2 25 23 - 29 mmol/L    Glucose 97 70 - 110 mg/dL    BUN 10 6 - 20 mg/dL    Creatinine 0.9 0.5 - 1.4 mg/dL    Calcium 9.4 8.7 - 10.5 mg/dL    Total Protein 7.4 6.0 - 8.4 g/dL    Albumin 4.2 3.5 - 5.2 g/dL    Total Bilirubin 0.5 0.1 - 1.0 mg/dL    Alkaline Phosphatase 48 (L) 55 - 135 U/L    AST 18 10 - 40 U/L    ALT 8 (L) 10 - 44 U/L    Anion Gap 10 8 - 16 mmol/L    eGFR >60 >60 mL/min/1.73 m^2   Sedimentation rate    Collection Time: 10/25/22  3:15 PM   Result Value Ref Range    Sed Rate 8 0 - 10 mm/Hr          Lab Results   Component Value Date    TBGOLDPLUS Negative 07/21/2022      Lab Results   Component Value Date    HEPBCAB Negative 07/21/2022    HEPCAB Negative 07/21/2022      Bilateral hand x-ray 07/21/2022  No acute fracture or dislocation.  Joint spaces appear to be relatively well maintained.  No erosive changes are identified.    Bilateral foot x-ray 07/21/2022  FINDINGS:  No acute fracture or dislocation.  There appears to be some possible soft tissue swelling adjacent to either 5th metatarsal head.  No erosive changes are identified.  Joint spaces appear to be relatively well maintained.     Assessment:       1. Rheumatoid arthritis involving multiple sites with positive rheumatoid factor    2. Tobacco use    3. Polyarthralgia    4. Family history of osteoporosis            Impression:   Rheumatoid arthritis with positive rheumatoid factor and polyarthralgias  Rheumatoid factor 340, elevated sed rate at 24, CCP 98.8. Prolonged morning stiffness lasting all day, polyarthralgias, swelling multiple joints.  Synovitis present bilateral hands, painful MTP squeeze.  Previously tried over-the-counter analgesics.  Family history with some form of arthritis in his father, also osteoporosis.  Denies interest in family planning at this time.  We  discussed risks of fetal toxicity with certain immunosuppressant medications.  Advised patient to let us know if at any point while on immunosuppressant therapy if he and his spouse would be trying for family planning.  80% improvement with Plaquenil once daily.  Still with frequent swelling multiple digits bilateral hands and prolonged stiffness.    Tobacco use  Current smoker 1 pack a day  Not interested in cessation    Family history osteoporosis  In father.  Also with patient with rheumatoid arthritis with med at increased risk for developing osteoporosis.    Rash in bilateral hands   Patient showed me photos on his bone of pinpoint red spots bilateral hands.  Occurs every time after opening new pair of gloves at work.  Improved after wearing them for a few days.  Possibly contact dermatitis.    Plan:              Increase Plaquenil to 200 mg twice daily.  Advised patient to let me know if any new or worsening symptoms or any side effects with increased dosage.      Advised patient let me know of any flares prior to next appointment.    Okay for prednisone p.r.n. for flares.  Discussed side effects and risks of this with patient, would like to avoid frequent use.      Recommend stopping smoking    Annual eye exams while on Plaquenil  Next step would be addition of methotrexate and folic acid.     Patient would prefer oral medication over injections as he is not comfortable with needles.    Try washing work gloves prior to use.  If rash continues, can reach out to Dermatology.    Discussed need for medication safety monitoring while on DMARD or other immunosuppressant.      Follow up 3-4 months with reg4  At the [place] because he is off work on Mondays.    Vidhya Barragan PA-C  [facility]  Rheumatology     30 minutes of total time spent on the encounter, which includes face to face time and non-face to face time preparing to see the patient (eg, review of tests), Obtaining and/or  reviewing separately obtained history, Documenting clinical information in the electronic or other health record, Independently interpreting results (not separately reported) and communicating results to the patient/family/caregiver, or Care coordination (not separately reported).

## 2022-10-28 ENCOUNTER — OFFICE VISIT (OUTPATIENT)
Dept: RHEUMATOLOGY | Facility: CLINIC | Age: 27
End: 2022-10-28
Payer: COMMERCIAL

## 2022-10-28 ENCOUNTER — PATIENT OUTREACH (OUTPATIENT)
Dept: ADMINISTRATIVE | Facility: HOSPITAL | Age: 27
End: 2022-10-28
Payer: COMMERCIAL

## 2022-10-28 ENCOUNTER — PATIENT MESSAGE (OUTPATIENT)
Dept: RHEUMATOLOGY | Facility: CLINIC | Age: 27
End: 2022-10-28

## 2022-10-28 VITALS
HEART RATE: 87 BPM | DIASTOLIC BLOOD PRESSURE: 94 MMHG | SYSTOLIC BLOOD PRESSURE: 137 MMHG | BODY MASS INDEX: 24.13 KG/M2 | HEIGHT: 69 IN | WEIGHT: 162.94 LBS

## 2022-10-28 DIAGNOSIS — M05.79 RHEUMATOID ARTHRITIS INVOLVING MULTIPLE SITES WITH POSITIVE RHEUMATOID FACTOR: Primary | ICD-10-CM

## 2022-10-28 DIAGNOSIS — M25.50 POLYARTHRALGIA: ICD-10-CM

## 2022-10-28 DIAGNOSIS — Z72.0 TOBACCO USE: ICD-10-CM

## 2022-10-28 DIAGNOSIS — Z79.899 LONG-TERM USE OF PLAQUENIL: ICD-10-CM

## 2022-10-28 DIAGNOSIS — Z82.62 FAMILY HISTORY OF OSTEOPOROSIS: ICD-10-CM

## 2022-10-28 DIAGNOSIS — Z79.899 LONG-TERM USE OF HIGH-RISK MEDICATION: Primary | ICD-10-CM

## 2022-10-28 PROCEDURE — 99999 PR PBB SHADOW E&M-EST. PATIENT-LVL III: ICD-10-PCS | Mod: PBBFAC,,,

## 2022-10-28 PROCEDURE — 3080F DIAST BP >= 90 MM HG: CPT | Mod: CPTII,S$GLB,,

## 2022-10-28 PROCEDURE — 99214 PR OFFICE/OUTPT VISIT, EST, LEVL IV, 30-39 MIN: ICD-10-PCS | Mod: S$GLB,,,

## 2022-10-28 PROCEDURE — 1159F PR MEDICATION LIST DOCUMENTED IN MEDICAL RECORD: ICD-10-PCS | Mod: CPTII,S$GLB,,

## 2022-10-28 PROCEDURE — 99999 PR PBB SHADOW E&M-EST. PATIENT-LVL III: CPT | Mod: PBBFAC,,,

## 2022-10-28 PROCEDURE — 1159F MED LIST DOCD IN RCRD: CPT | Mod: CPTII,S$GLB,,

## 2022-10-28 PROCEDURE — 1160F RVW MEDS BY RX/DR IN RCRD: CPT | Mod: CPTII,S$GLB,,

## 2022-10-28 PROCEDURE — 1160F PR REVIEW ALL MEDS BY PRESCRIBER/CLIN PHARMACIST DOCUMENTED: ICD-10-PCS | Mod: CPTII,S$GLB,,

## 2022-10-28 PROCEDURE — 3075F SYST BP GE 130 - 139MM HG: CPT | Mod: CPTII,S$GLB,,

## 2022-10-28 PROCEDURE — 3080F PR MOST RECENT DIASTOLIC BLOOD PRESSURE >= 90 MM HG: ICD-10-PCS | Mod: CPTII,S$GLB,,

## 2022-10-28 PROCEDURE — 99214 OFFICE O/P EST MOD 30 MIN: CPT | Mod: S$GLB,,,

## 2022-10-28 PROCEDURE — 3075F PR MOST RECENT SYSTOLIC BLOOD PRESS GE 130-139MM HG: ICD-10-PCS | Mod: CPTII,S$GLB,,

## 2022-10-28 RX ORDER — HYDROXYCHLOROQUINE SULFATE 200 MG/1
200 TABLET, FILM COATED ORAL 2 TIMES DAILY
Qty: 180 TABLET | Refills: 1 | Status: SHIPPED | OUTPATIENT
Start: 2022-10-28 | End: 2022-12-14 | Stop reason: SDUPTHER

## 2022-11-09 ENCOUNTER — PATIENT OUTREACH (OUTPATIENT)
Dept: ADMINISTRATIVE | Facility: HOSPITAL | Age: 27
End: 2022-11-09
Payer: COMMERCIAL

## 2022-11-09 NOTE — PROGRESS NOTES
Attempted to contact patient by phone for PCP visit, was able to speak to patient. Patient changed PCP to .

## 2023-02-09 ENCOUNTER — LAB VISIT (OUTPATIENT)
Dept: LAB | Facility: HOSPITAL | Age: 28
End: 2023-02-09
Payer: COMMERCIAL

## 2023-02-09 DIAGNOSIS — Z79.899 LONG-TERM USE OF PLAQUENIL: ICD-10-CM

## 2023-02-09 LAB
ALBUMIN SERPL BCP-MCNC: 3.8 G/DL (ref 3.5–5.2)
ALP SERPL-CCNC: 51 U/L (ref 55–135)
ALT SERPL W/O P-5'-P-CCNC: 14 U/L (ref 10–44)
ANION GAP SERPL CALC-SCNC: 13 MMOL/L (ref 8–16)
AST SERPL-CCNC: 24 U/L (ref 10–40)
BASOPHILS # BLD AUTO: 0.06 K/UL (ref 0–0.2)
BASOPHILS NFR BLD: 1 % (ref 0–1.9)
BILIRUB SERPL-MCNC: 0.8 MG/DL (ref 0.1–1)
BUN SERPL-MCNC: 12 MG/DL (ref 6–20)
CALCIUM SERPL-MCNC: 10 MG/DL (ref 8.7–10.5)
CHLORIDE SERPL-SCNC: 103 MMOL/L (ref 95–110)
CO2 SERPL-SCNC: 22 MMOL/L (ref 23–29)
CREAT SERPL-MCNC: 1 MG/DL (ref 0.5–1.4)
CRP SERPL-MCNC: 4.6 MG/L (ref 0–8.2)
DIFFERENTIAL METHOD: ABNORMAL
EOSINOPHIL # BLD AUTO: 0.1 K/UL (ref 0–0.5)
EOSINOPHIL NFR BLD: 1.8 % (ref 0–8)
ERYTHROCYTE [DISTWIDTH] IN BLOOD BY AUTOMATED COUNT: 12.2 % (ref 11.5–14.5)
ERYTHROCYTE [SEDIMENTATION RATE] IN BLOOD BY WESTERGREN METHOD: 15 MM/HR (ref 0–10)
EST. GFR  (NO RACE VARIABLE): >60 ML/MIN/1.73 M^2
GLUCOSE SERPL-MCNC: 88 MG/DL (ref 70–110)
HCT VFR BLD AUTO: 40.5 % (ref 40–54)
HGB BLD-MCNC: 13.7 G/DL (ref 14–18)
IMM GRANULOCYTES # BLD AUTO: 0.02 K/UL (ref 0–0.04)
IMM GRANULOCYTES NFR BLD AUTO: 0.3 % (ref 0–0.5)
LYMPHOCYTES # BLD AUTO: 2.3 K/UL (ref 1–4.8)
LYMPHOCYTES NFR BLD: 38.2 % (ref 18–48)
MCH RBC QN AUTO: 30.6 PG (ref 27–31)
MCHC RBC AUTO-ENTMCNC: 33.8 G/DL (ref 32–36)
MCV RBC AUTO: 90 FL (ref 82–98)
MONOCYTES # BLD AUTO: 0.5 K/UL (ref 0.3–1)
MONOCYTES NFR BLD: 8.2 % (ref 4–15)
NEUTROPHILS # BLD AUTO: 3 K/UL (ref 1.8–7.7)
NEUTROPHILS NFR BLD: 50.5 % (ref 38–73)
NRBC BLD-RTO: 0 /100 WBC
PLATELET # BLD AUTO: 259 K/UL (ref 150–450)
PMV BLD AUTO: 9.6 FL (ref 9.2–12.9)
POTASSIUM SERPL-SCNC: 3.8 MMOL/L (ref 3.5–5.1)
PROT SERPL-MCNC: 7.7 G/DL (ref 6–8.4)
RBC # BLD AUTO: 4.48 M/UL (ref 4.6–6.2)
SODIUM SERPL-SCNC: 138 MMOL/L (ref 136–145)
WBC # BLD AUTO: 5.97 K/UL (ref 3.9–12.7)

## 2023-02-09 PROCEDURE — 80053 COMPREHEN METABOLIC PANEL: CPT

## 2023-02-09 PROCEDURE — 36415 COLL VENOUS BLD VENIPUNCTURE: CPT | Mod: PO

## 2023-02-09 PROCEDURE — 85651 RBC SED RATE NONAUTOMATED: CPT

## 2023-02-09 PROCEDURE — 86140 C-REACTIVE PROTEIN: CPT

## 2023-02-09 PROCEDURE — 85025 COMPLETE CBC W/AUTO DIFF WBC: CPT

## 2023-02-20 ENCOUNTER — OFFICE VISIT (OUTPATIENT)
Dept: RHEUMATOLOGY | Facility: CLINIC | Age: 28
End: 2023-02-20
Payer: COMMERCIAL

## 2023-02-20 ENCOUNTER — PATIENT MESSAGE (OUTPATIENT)
Dept: RHEUMATOLOGY | Facility: CLINIC | Age: 28
End: 2023-02-20

## 2023-02-20 VITALS
HEIGHT: 69 IN | SYSTOLIC BLOOD PRESSURE: 133 MMHG | WEIGHT: 166 LBS | DIASTOLIC BLOOD PRESSURE: 80 MMHG | HEART RATE: 99 BPM | BODY MASS INDEX: 24.59 KG/M2

## 2023-02-20 DIAGNOSIS — Z79.899 LONG-TERM USE OF HIGH-RISK MEDICATION: ICD-10-CM

## 2023-02-20 DIAGNOSIS — Z79.899 LONG-TERM USE OF PLAQUENIL: ICD-10-CM

## 2023-02-20 DIAGNOSIS — M25.50 POLYARTHRALGIA: ICD-10-CM

## 2023-02-20 DIAGNOSIS — M05.79 RHEUMATOID ARTHRITIS INVOLVING MULTIPLE SITES WITH POSITIVE RHEUMATOID FACTOR: Primary | ICD-10-CM

## 2023-02-20 DIAGNOSIS — Z72.0 TOBACCO USE: ICD-10-CM

## 2023-02-20 PROCEDURE — 3008F PR BODY MASS INDEX (BMI) DOCUMENTED: ICD-10-PCS | Mod: CPTII,S$GLB,,

## 2023-02-20 PROCEDURE — 99999 PR PBB SHADOW E&M-EST. PATIENT-LVL III: CPT | Mod: PBBFAC,,,

## 2023-02-20 PROCEDURE — 99999 PR PBB SHADOW E&M-EST. PATIENT-LVL III: ICD-10-PCS | Mod: PBBFAC,,,

## 2023-02-20 PROCEDURE — 1159F PR MEDICATION LIST DOCUMENTED IN MEDICAL RECORD: ICD-10-PCS | Mod: CPTII,S$GLB,,

## 2023-02-20 PROCEDURE — 3075F PR MOST RECENT SYSTOLIC BLOOD PRESS GE 130-139MM HG: ICD-10-PCS | Mod: CPTII,S$GLB,,

## 2023-02-20 PROCEDURE — 3079F DIAST BP 80-89 MM HG: CPT | Mod: CPTII,S$GLB,,

## 2023-02-20 PROCEDURE — 1160F PR REVIEW ALL MEDS BY PRESCRIBER/CLIN PHARMACIST DOCUMENTED: ICD-10-PCS | Mod: CPTII,S$GLB,,

## 2023-02-20 PROCEDURE — 3008F BODY MASS INDEX DOCD: CPT | Mod: CPTII,S$GLB,,

## 2023-02-20 PROCEDURE — 99214 PR OFFICE/OUTPT VISIT, EST, LEVL IV, 30-39 MIN: ICD-10-PCS | Mod: S$GLB,,,

## 2023-02-20 PROCEDURE — 3079F PR MOST RECENT DIASTOLIC BLOOD PRESSURE 80-89 MM HG: ICD-10-PCS | Mod: CPTII,S$GLB,,

## 2023-02-20 PROCEDURE — 1159F MED LIST DOCD IN RCRD: CPT | Mod: CPTII,S$GLB,,

## 2023-02-20 PROCEDURE — 3075F SYST BP GE 130 - 139MM HG: CPT | Mod: CPTII,S$GLB,,

## 2023-02-20 PROCEDURE — 1160F RVW MEDS BY RX/DR IN RCRD: CPT | Mod: CPTII,S$GLB,,

## 2023-02-20 PROCEDURE — 99214 OFFICE O/P EST MOD 30 MIN: CPT | Mod: S$GLB,,,

## 2023-02-20 RX ORDER — METHOTREXATE 2.5 MG/1
TABLET ORAL
Qty: 24 TABLET | Refills: 3 | Status: SHIPPED | OUTPATIENT
Start: 2023-02-20 | End: 2023-03-27 | Stop reason: SDUPTHER

## 2023-02-20 RX ORDER — FOLIC ACID 1 MG/1
1 TABLET ORAL DAILY
Qty: 90 TABLET | Refills: 1 | Status: SHIPPED | OUTPATIENT
Start: 2023-02-20 | End: 2023-06-05

## 2023-02-20 ASSESSMENT — ROUTINE ASSESSMENT OF PATIENT INDEX DATA (RAPID3): MDHAQ FUNCTION SCORE: 0

## 2023-02-20 NOTE — PATIENT INSTRUCTIONS
Methotrexate:  Start with 4 tablets once weekly (10 mg)  for two weeks  Then increase to 6 tablets once weekly (15 mg) split dose. 3 tablets in morning, 3 in evening on same day    Folic acid 1 mg daily

## 2023-02-20 NOTE — PROGRESS NOTES
RHEUMATOLOGY OUTPATIENT CLINIC NOTE    02/20/2023    Subjective:       Patient ID: Be Brito is a 27 y.o. male.    Chief Complaint: Rheumatoid Arthritis        HPI   Be Brito is a 27 y.o. pleasant male here for rheumatology follow up. Referred for elevated RF.     He still has pain in various joints (fingers, wrists, shoulder) with pain and swelling. This occurs every few days. Overall he reports significant improvement with hydroxychloroquine 200 mg bid.     Morning stiffness is significantly down.  Denies any pain today.  Rash on hands resolved with washing work gloves.   No muscle weakness.   No fever, lymphadenopathy, no unexpected weight loss, no fatigue  No rashes on palms, no vasculitis  No shortness of breath or pleuritic chest pain.     Rheumatologic review of systems negative otherwise.     Physical exam No obvious synovitis, no erythema, no increased warmth to any joints benita UE/LE. Synovial thickening benita middle finger PIPs.   Nonpainful MTP squeeze.      Initial HPI:  He reports a many year (7-8) history of joint pain.  He does not like coming to the doctor's, and the pain generally goes away on its own.  However recently, the pain has been unrelenting.  Most notable in his fingers, knuckles, ankles, knees.  He reports swelling most notably in his MCPs, occasionally red during severe flares.  Also with difficulty with his  strength.  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  He reports stiffness lasting all day long.  Previously tried various over-the-counter analgesics including Tylenol, Aleve, ibuprofen without much relief.    Fam hx:  Father with osteoporosis, arthritis?    Tobacco:  Current smoker pack a day.  Alcohol: 2 beers a day  Denies other illicit drugs.       History reviewed. No pertinent past medical history.  Past Surgical History:   Procedure Laterality Date    TONSILLECTOMY      trigger finger injection   "     Family History   Problem Relation Age of Onset    Lung cancer Mother     Leukemia Father     Osteoporosis Father     Arthritis Father     Colon cancer Neg Hx     Prostate cancer Neg Hx      Social History     Socioeconomic History    Marital status: Single    Number of children: 0   Tobacco Use    Smoking status: Every Day     Packs/day: 1.00     Types: Cigarettes    Smokeless tobacco: Current   Substance and Sexual Activity    Alcohol use: Yes     Alcohol/week: 25.0 standard drinks     Types: 25 Cans of beer per week    Drug use: Never     Review of patient's allergies indicates:  No Known Allergies        Objective:   /80   Pulse 99   Ht 5' 9" (1.753 m)   Wt 75.3 kg (166 lb 0.1 oz)   BMI 24.51 kg/m²     There is no immunization history on file for this patient.       Recent Results (from the past 672 hour(s))   CBC Auto Differential    Collection Time: 02/09/23  4:20 PM   Result Value Ref Range    WBC 5.97 3.90 - 12.70 K/uL    RBC 4.48 (L) 4.60 - 6.20 M/uL    Hemoglobin 13.7 (L) 14.0 - 18.0 g/dL    Hematocrit 40.5 40.0 - 54.0 %    MCV 90 82 - 98 fL    MCH 30.6 27.0 - 31.0 pg    MCHC 33.8 32.0 - 36.0 g/dL    RDW 12.2 11.5 - 14.5 %    Platelets 259 150 - 450 K/uL    MPV 9.6 9.2 - 12.9 fL    Immature Granulocytes 0.3 0.0 - 0.5 %    Gran # (ANC) 3.0 1.8 - 7.7 K/uL    Immature Grans (Abs) 0.02 0.00 - 0.04 K/uL    Lymph # 2.3 1.0 - 4.8 K/uL    Mono # 0.5 0.3 - 1.0 K/uL    Eos # 0.1 0.0 - 0.5 K/uL    Baso # 0.06 0.00 - 0.20 K/uL    nRBC 0 0 /100 WBC    Gran % 50.5 38.0 - 73.0 %    Lymph % 38.2 18.0 - 48.0 %    Mono % 8.2 4.0 - 15.0 %    Eosinophil % 1.8 0.0 - 8.0 %    Basophil % 1.0 0.0 - 1.9 %    Differential Method Automated    Comprehensive Metabolic Panel    Collection Time: 02/09/23  4:20 PM   Result Value Ref Range    Sodium 138 136 - 145 mmol/L    Potassium 3.8 3.5 - 5.1 mmol/L    Chloride 103 95 - 110 mmol/L    CO2 22 (L) 23 - 29 mmol/L    Glucose 88 70 - 110 mg/dL    BUN 12 6 - 20 mg/dL    " Creatinine 1.0 0.5 - 1.4 mg/dL    Calcium 10.0 8.7 - 10.5 mg/dL    Total Protein 7.7 6.0 - 8.4 g/dL    Albumin 3.8 3.5 - 5.2 g/dL    Total Bilirubin 0.8 0.1 - 1.0 mg/dL    Alkaline Phosphatase 51 (L) 55 - 135 U/L    AST 24 10 - 40 U/L    ALT 14 10 - 44 U/L    Anion Gap 13 8 - 16 mmol/L    eGFR >60 >60 mL/min/1.73 m^2   C-Reactive Protein    Collection Time: 02/09/23  4:20 PM   Result Value Ref Range    CRP 4.6 0.0 - 8.2 mg/L   Sedimentation rate    Collection Time: 02/09/23  4:20 PM   Result Value Ref Range    Sed Rate 15 (H) 0 - 10 mm/Hr          Lab Results   Component Value Date    TBGOLDPLUS Negative 07/21/2022      Lab Results   Component Value Date    HEPBCAB Negative 07/21/2022    HEPCAB Negative 07/21/2022      Bilateral hand x-ray 07/21/2022  No acute fracture or dislocation.  Joint spaces appear to be relatively well maintained.  No erosive changes are identified.    Bilateral foot x-ray 07/21/2022  FINDINGS:  No acute fracture or dislocation.  There appears to be some possible soft tissue swelling adjacent to either 5th metatarsal head.  No erosive changes are identified.  Joint spaces appear to be relatively well maintained.     Assessment:       1. Rheumatoid arthritis involving multiple sites with positive rheumatoid factor    2. Long-term use of high-risk medication    3. Long-term use of Plaquenil    4. Tobacco use    5. Polyarthralgia              Impression:   Rheumatoid arthritis with positive rheumatoid factor and polyarthralgias  Rheumatoid factor 340, elevated sed rate at 24, CCP 98.8. Prolonged morning stiffness lasting all day, polyarthralgias, swelling multiple joints.  Synovitis present bilateral hands, painful MTP squeeze.  Previously tried over-the-counter analgesics.  Family history with some form of arthritis in his father, also osteoporosis.  Denies interest in family planning at this time.  We discussed risks of fetal toxicity with certain immunosuppressant medications.  Advised  patient to let us know if at any point while on immunosuppressant therapy if he and his spouse would be trying for family planning.  80% improvement with Plaquenil once daily.  Still with frequent swelling multiple digits bilateral hands and prolonged stiffness.  Improvement in symptoms with hydroxychloroquine 200 mg bid but still frequent weekly joint swelling and pain.     Tobacco use  Current smoker 1 pack a day  Not interested in cessation    Family history osteoporosis  In father.  Also with patient with rheumatoid arthritis with med at increased risk for developing osteoporosis.    Rash in bilateral hands   Patient showed me photos on his bone of pinpoint red spots bilateral hands.  Occurs every time after opening new pair of gloves at work.  Improved after wearing them for a few days.  Possibly contact dermatitis.  Resolved with washing gloves      Plan:           RA:  Continue Plaquenil to 200 mg twice daily.  Consider decreasing back to once daily if gets controlled on mtx  Start mtx (2/2023)  10 mg weekly x 2 weeks  Then increase to 15 mg weekly split dose  Folic acid daily 1 mg daily  Check cbc cmp in one month to monitor for mtx toxicity  Advised patient let me know of any flares prior to next appointment.    Okay for prednisone p.r.n. for flares.  Discussed side effects and risks of this with patient, would like to avoid frequent use.      Tobacco use  Recommend stopping smoking    Annual eye exams while on Plaquenil    Patient would prefer oral medication over injections as he is not comfortable with needles.    Discussed need for medication safety monitoring while on DMARD or other immunosuppressant.    Repeat CBC, CMP in 4 weeks in [place]  Follow up 4 months with reg4  At the [place] because he is off work on Mondays.    Vidhya Barragan PA-C  [facility]  Rheumatology     30 minutes of total time spent on the encounter, which includes face to face time and non-face to  face time preparing to see the patient (eg, review of tests), Obtaining and/or reviewing separately obtained history, Documenting clinical information in the electronic or other health record, Independently interpreting results (not separately reported) and communicating results to the patient/family/caregiver, or Care coordination (not separately reported).

## 2023-03-20 ENCOUNTER — LAB VISIT (OUTPATIENT)
Dept: LAB | Facility: HOSPITAL | Age: 28
End: 2023-03-20
Payer: COMMERCIAL

## 2023-03-20 DIAGNOSIS — Z79.899 LONG-TERM USE OF PLAQUENIL: ICD-10-CM

## 2023-03-20 LAB
ALBUMIN SERPL BCP-MCNC: 4 G/DL (ref 3.5–5.2)
ALP SERPL-CCNC: 41 U/L (ref 55–135)
ALT SERPL W/O P-5'-P-CCNC: 12 U/L (ref 10–44)
ANION GAP SERPL CALC-SCNC: 10 MMOL/L (ref 8–16)
AST SERPL-CCNC: 19 U/L (ref 10–40)
BASOPHILS # BLD AUTO: 0.06 K/UL (ref 0–0.2)
BASOPHILS NFR BLD: 0.9 % (ref 0–1.9)
BILIRUB SERPL-MCNC: 0.7 MG/DL (ref 0.1–1)
BUN SERPL-MCNC: 11 MG/DL (ref 6–20)
CALCIUM SERPL-MCNC: 8.9 MG/DL (ref 8.7–10.5)
CHLORIDE SERPL-SCNC: 104 MMOL/L (ref 95–110)
CO2 SERPL-SCNC: 25 MMOL/L (ref 23–29)
CREAT SERPL-MCNC: 0.9 MG/DL (ref 0.5–1.4)
DIFFERENTIAL METHOD: ABNORMAL
EOSINOPHIL # BLD AUTO: 0.2 K/UL (ref 0–0.5)
EOSINOPHIL NFR BLD: 2.8 % (ref 0–8)
ERYTHROCYTE [DISTWIDTH] IN BLOOD BY AUTOMATED COUNT: 12.7 % (ref 11.5–14.5)
EST. GFR  (NO RACE VARIABLE): >60 ML/MIN/1.73 M^2
GLUCOSE SERPL-MCNC: 96 MG/DL (ref 70–110)
HCT VFR BLD AUTO: 40.4 % (ref 40–54)
HGB BLD-MCNC: 13.9 G/DL (ref 14–18)
IMM GRANULOCYTES # BLD AUTO: 0.02 K/UL (ref 0–0.04)
IMM GRANULOCYTES NFR BLD AUTO: 0.3 % (ref 0–0.5)
LYMPHOCYTES # BLD AUTO: 1.9 K/UL (ref 1–4.8)
LYMPHOCYTES NFR BLD: 30.1 % (ref 18–48)
MCH RBC QN AUTO: 31.4 PG (ref 27–31)
MCHC RBC AUTO-ENTMCNC: 34.4 G/DL (ref 32–36)
MCV RBC AUTO: 91 FL (ref 82–98)
MONOCYTES # BLD AUTO: 0.5 K/UL (ref 0.3–1)
MONOCYTES NFR BLD: 8 % (ref 4–15)
NEUTROPHILS # BLD AUTO: 3.7 K/UL (ref 1.8–7.7)
NEUTROPHILS NFR BLD: 57.9 % (ref 38–73)
NRBC BLD-RTO: 0 /100 WBC
PLATELET # BLD AUTO: 255 K/UL (ref 150–450)
PMV BLD AUTO: 9.8 FL (ref 9.2–12.9)
POTASSIUM SERPL-SCNC: 4.2 MMOL/L (ref 3.5–5.1)
PROT SERPL-MCNC: 7.3 G/DL (ref 6–8.4)
RBC # BLD AUTO: 4.42 M/UL (ref 4.6–6.2)
SODIUM SERPL-SCNC: 139 MMOL/L (ref 136–145)
WBC # BLD AUTO: 6.37 K/UL (ref 3.9–12.7)

## 2023-03-20 PROCEDURE — 85025 COMPLETE CBC W/AUTO DIFF WBC: CPT

## 2023-03-20 PROCEDURE — 36415 COLL VENOUS BLD VENIPUNCTURE: CPT | Mod: PO

## 2023-03-20 PROCEDURE — 80053 COMPREHEN METABOLIC PANEL: CPT

## 2023-05-01 ENCOUNTER — PATIENT MESSAGE (OUTPATIENT)
Dept: RHEUMATOLOGY | Facility: CLINIC | Age: 28
End: 2023-05-01
Payer: COMMERCIAL

## 2023-05-09 ENCOUNTER — PATIENT MESSAGE (OUTPATIENT)
Dept: RHEUMATOLOGY | Facility: CLINIC | Age: 28
End: 2023-05-09
Payer: COMMERCIAL

## 2023-05-24 ENCOUNTER — PATIENT MESSAGE (OUTPATIENT)
Dept: INTERNAL MEDICINE | Facility: CLINIC | Age: 28
End: 2023-05-24
Payer: COMMERCIAL

## 2023-05-25 ENCOUNTER — OFFICE VISIT (OUTPATIENT)
Dept: INTERNAL MEDICINE | Facility: CLINIC | Age: 28
End: 2023-05-25
Payer: COMMERCIAL

## 2023-05-25 DIAGNOSIS — R53.83 FATIGUE, UNSPECIFIED TYPE: ICD-10-CM

## 2023-05-25 DIAGNOSIS — M06.9 RHEUMATOID ARTHRITIS, INVOLVING UNSPECIFIED SITE, UNSPECIFIED WHETHER RHEUMATOID FACTOR PRESENT: ICD-10-CM

## 2023-05-25 DIAGNOSIS — Z79.631 METHOTREXATE, LONG TERM, CURRENT USE: ICD-10-CM

## 2023-05-25 DIAGNOSIS — G47.00 INSOMNIA, UNSPECIFIED TYPE: Primary | ICD-10-CM

## 2023-05-25 PROCEDURE — 99214 OFFICE O/P EST MOD 30 MIN: CPT | Mod: 95,,, | Performed by: FAMILY MEDICINE

## 2023-05-25 PROCEDURE — 1159F MED LIST DOCD IN RCRD: CPT | Mod: CPTII,95,, | Performed by: FAMILY MEDICINE

## 2023-05-25 PROCEDURE — 1159F PR MEDICATION LIST DOCUMENTED IN MEDICAL RECORD: ICD-10-PCS | Mod: CPTII,95,, | Performed by: FAMILY MEDICINE

## 2023-05-25 PROCEDURE — 99214 PR OFFICE/OUTPT VISIT, EST, LEVL IV, 30-39 MIN: ICD-10-PCS | Mod: 95,,, | Performed by: FAMILY MEDICINE

## 2023-05-25 NOTE — PROGRESS NOTES
"Be Brito  05/25/2023  [id number]    Lawanda Martinez MD  Patient Care Team:  Lawanda Martinez MD as PCP - General (Family Medicine)          Visit Type:an urgent visit for a new problem    Chief Complaint:Sleep and fatigue    History of Present Illness:  28 year old  C/o of fatigue  PA Barragan changed the methotrexate to once a week.    Feels no energy- over one month  Takes methoxtrete on Mondays. In am and in pm.  Feels like he is "hit by truck". Felt this more on Tuesday and now lasting longer.  Reports no energy    Reports can't stay asleep at night  Rheum suggested he take Mucinex DM  RA:  Continue Plaquenil to 200 mg twice daily.  Consider decreasing back to once daily if gets controlled on mtx  Start mtx (2/2023)  10 mg weekly x 2 weeks  Then increase to 15 mg weekly split dose  Folic acid daily 1 mg daily  Check cbc cmp in one month to monitor for mtx toxicity  Advised patient let me know of any flares prior to next appointment.    Okay for prednisone p.r.n. for flares.  Discussed side effects and risks of this with patient, would like to avoid frequent use.     Does not admit to depression or anxiety  Sleep is poor in the way he wakes up.  He has tried OTC medication, like melatonin  He said he feels worse in the morning when he takes it.      History:  History reviewed. No pertinent past medical history.  Past Surgical History:   Procedure Laterality Date    TONSILLECTOMY      trigger finger injection       Family History   Problem Relation Age of Onset    Lung cancer Mother     Leukemia Father     Osteoporosis Father     Arthritis Father     Colon cancer Neg Hx     Prostate cancer Neg Hx      Social History     Socioeconomic History    Marital status: Single    Number of children: 0   Tobacco Use    Smoking status: Every Day     Packs/day: 1.00     Types: Cigarettes    Smokeless tobacco: Current   Substance and Sexual Activity    Alcohol use: Yes     Alcohol/week: 25.0 standard drinks     Types: " 25 Cans of beer per week    Drug use: Never     There is no problem list on file for this patient.    Review of patient's allergies indicates:  No Known Allergies    The following were reviewed at this visit: active problem list, medication list, allergies, family history, social history, and health maintenance.    Medications:  Current Outpatient Medications on File Prior to Visit   Medication Sig Dispense Refill    acetaminophen (TYLENOL ORAL) Take by mouth.      folic acid (FOLVITE) 1 MG tablet Take 1 tablet (1 mg total) by mouth once daily. 90 tablet 1    hydrOXYchloroQUINE (PLAQUENIL) 200 mg tablet Take 1 tablet (200 mg total) by mouth 2 (two) times daily. 180 tablet 1    methotrexate 2.5 MG Tab Take 6 tablets (15 mg total) by mouth every 7 days. Split dose 3 tab in am, 3 tab in pm on same day. 24 tablet 3     No current facility-administered medications on file prior to visit.       Medications have been reviewed and reconciled with patient at this visit.  Barriers to medications reviewed with patient.    Adverse reactions to current medications reviewed with patient..    Over the counter medications reviewed and reconciled with patient.    Exam:  Wt Readings from Last 3 Encounters:   02/20/23 75.3 kg (166 lb 0.1 oz)   10/28/22 73.9 kg (162 lb 14.7 oz)   07/21/22 71.4 kg (157 lb 6.5 oz)     Temp Readings from Last 3 Encounters:   07/11/22 98.8 °F (37.1 °C) (Temporal)   11/06/19 97.7 °F (36.5 °C) (Tympanic)   10/23/19 97.7 °F (36.5 °C) (Tympanic)     BP Readings from Last 3 Encounters:   02/20/23 133/80   10/28/22 (!) 137/94   07/21/22 129/82     Pulse Readings from Last 3 Encounters:   02/20/23 99   10/28/22 87   07/21/22 83     There is no height or weight on file to calculate BMI.      Review of Systems   HENT:  Negative for hearing loss.    Eyes:  Negative for discharge.   Respiratory:  Negative for wheezing.    Cardiovascular:  Negative for chest pain and palpitations.   Gastrointestinal:  Positive for  diarrhea. Negative for blood in stool, constipation and vomiting.   Genitourinary:  Negative for hematuria and urgency.   Neurological:  Positive for weakness. Negative for headaches.   Endo/Heme/Allergies:  Negative for polydipsia.   Psychiatric/Behavioral:  The patient has insomnia.    Physical Exam  Nursing note reviewed.   Pulmonary:      Effort: Pulmonary effort is normal. No respiratory distress.   Neurological:      Mental Status: He is alert and oriented to person, place, and time.   Psychiatric:         Mood and Affect: Mood normal.         Behavior: Behavior normal.         Thought Content: Thought content normal.         Judgment: Judgment normal.       Laboratory Reviewed ({Yes)  Lab Results   Component Value Date    WBC 6.37 03/20/2023    HGB 13.9 (L) 03/20/2023    HCT 40.4 03/20/2023     03/20/2023    CHOL 161 11/06/2019    TRIG 35 11/06/2019    HDL 65 11/06/2019    ALT 12 03/20/2023    AST 19 03/20/2023     03/20/2023    K 4.2 03/20/2023     03/20/2023    CREATININE 0.9 03/20/2023    BUN 11 03/20/2023    CO2 25 03/20/2023    TSH 0.824 11/06/2019       Be was seen today for insomnia.    Diagnoses and all orders for this visit:    Insomnia, unspecified type  -     TESTOSTERONE; Future    Rheumatoid arthritis, involving unspecified site, unspecified whether rheumatoid factor present  -     CBC Auto Differential; Future  -     Comprehensive Metabolic Panel; Future  -     TSH; Future  -     Sedimentation rate; Future    Methotrexate, long term, current use  -     CBC Auto Differential; Future  -     Comprehensive Metabolic Panel; Future  -     TSH; Future  -     METHOTREXATE LEVEL; Future    Fatigue, unspecified type  -     Sedimentation rate; Future  -     TESTOSTERONE; Future        Possible sleep study- if labs are bad  He may need to decide to go different agents, and adding new biologic if not tolerating Plaq and Methotrexate.  Some concern with vision on Plaquenil.    Does  not currently want a sleep aid      Care Plan/Goals: Reviewed    Goals    None         Follow up: Follow up in about 3 months (around 8/25/2023).    After visit summary was printed and given to patient upon discharge today.  Patient goals and care plan are included in After Visit Summary.  Answers submitted by the patient for this visit:  Review of Systems Questionnaire (Submitted on 5/24/2023)  activity change: Yes  trouble swallowing: No  visual disturbance: No  chest tightness: No  polyuria: No  difficulty urinating: No  joint swelling: No  arthralgias: No  confusion: No  dysphoric mood: Yes

## 2023-05-31 ENCOUNTER — TELEPHONE (OUTPATIENT)
Dept: RHEUMATOLOGY | Facility: CLINIC | Age: 28
End: 2023-05-31
Payer: COMMERCIAL

## 2023-05-31 ENCOUNTER — PATIENT MESSAGE (OUTPATIENT)
Dept: RHEUMATOLOGY | Facility: CLINIC | Age: 28
End: 2023-05-31
Payer: COMMERCIAL

## 2023-06-02 ENCOUNTER — LAB VISIT (OUTPATIENT)
Dept: LAB | Facility: HOSPITAL | Age: 28
End: 2023-06-02
Payer: COMMERCIAL

## 2023-06-02 DIAGNOSIS — R53.83 FATIGUE, UNSPECIFIED TYPE: ICD-10-CM

## 2023-06-02 DIAGNOSIS — Z79.631 METHOTREXATE, LONG TERM, CURRENT USE: ICD-10-CM

## 2023-06-02 DIAGNOSIS — Z79.899 LONG-TERM USE OF PLAQUENIL: ICD-10-CM

## 2023-06-02 DIAGNOSIS — M06.9 RHEUMATOID ARTHRITIS, INVOLVING UNSPECIFIED SITE, UNSPECIFIED WHETHER RHEUMATOID FACTOR PRESENT: ICD-10-CM

## 2023-06-02 DIAGNOSIS — G47.00 INSOMNIA, UNSPECIFIED TYPE: ICD-10-CM

## 2023-06-02 LAB
ALBUMIN SERPL BCP-MCNC: 4.3 G/DL (ref 3.5–5.2)
ALP SERPL-CCNC: 43 U/L (ref 55–135)
ALT SERPL W/O P-5'-P-CCNC: 13 U/L (ref 10–44)
ANION GAP SERPL CALC-SCNC: 5 MMOL/L (ref 8–16)
AST SERPL-CCNC: 20 U/L (ref 10–40)
BASOPHILS # BLD AUTO: 0.08 K/UL (ref 0–0.2)
BASOPHILS NFR BLD: 1.8 % (ref 0–1.9)
BILIRUB SERPL-MCNC: 1.5 MG/DL (ref 0.1–1)
BUN SERPL-MCNC: 13 MG/DL (ref 6–20)
CALCIUM SERPL-MCNC: 9.4 MG/DL (ref 8.7–10.5)
CHLORIDE SERPL-SCNC: 105 MMOL/L (ref 95–110)
CO2 SERPL-SCNC: 27 MMOL/L (ref 23–29)
CREAT SERPL-MCNC: 0.8 MG/DL (ref 0.5–1.4)
CRP SERPL-MCNC: 1.9 MG/L (ref 0–8.2)
DIFFERENTIAL METHOD: ABNORMAL
EOSINOPHIL # BLD AUTO: 0 K/UL (ref 0–0.5)
EOSINOPHIL NFR BLD: 0 % (ref 0–8)
ERYTHROCYTE [DISTWIDTH] IN BLOOD BY AUTOMATED COUNT: 12.8 % (ref 11.5–14.5)
ERYTHROCYTE [SEDIMENTATION RATE] IN BLOOD BY WESTERGREN METHOD: 3 MM/HR (ref 0–10)
EST. GFR  (NO RACE VARIABLE): >60 ML/MIN/1.73 M^2
GLUCOSE SERPL-MCNC: 91 MG/DL (ref 70–110)
HCT VFR BLD AUTO: 40.1 % (ref 40–54)
HGB BLD-MCNC: 13.5 G/DL (ref 14–18)
IMM GRANULOCYTES # BLD AUTO: 0.01 K/UL (ref 0–0.04)
IMM GRANULOCYTES NFR BLD AUTO: 0.2 % (ref 0–0.5)
LYMPHOCYTES # BLD AUTO: 1.5 K/UL (ref 1–4.8)
LYMPHOCYTES NFR BLD: 34.5 % (ref 18–48)
MCH RBC QN AUTO: 31.7 PG (ref 27–31)
MCHC RBC AUTO-ENTMCNC: 33.7 G/DL (ref 32–36)
MCV RBC AUTO: 94 FL (ref 82–98)
MONOCYTES # BLD AUTO: 0.4 K/UL (ref 0.3–1)
MONOCYTES NFR BLD: 8.3 % (ref 4–15)
MTX SERPL-SCNC: <0.04 UMOL/L (ref 0.5–5)
NEUTROPHILS # BLD AUTO: 2.5 K/UL (ref 1.8–7.7)
NEUTROPHILS NFR BLD: 55.2 % (ref 38–73)
NRBC BLD-RTO: 0 /100 WBC
PLATELET # BLD AUTO: 240 K/UL (ref 150–450)
PMV BLD AUTO: 9.6 FL (ref 9.2–12.9)
POTASSIUM SERPL-SCNC: 4.1 MMOL/L (ref 3.5–5.1)
PROT SERPL-MCNC: 7.3 G/DL (ref 6–8.4)
RBC # BLD AUTO: 4.26 M/UL (ref 4.6–6.2)
SODIUM SERPL-SCNC: 137 MMOL/L (ref 136–145)
TESTOST SERPL-MCNC: 704 NG/DL (ref 304–1227)
TSH SERPL DL<=0.005 MIU/L-ACNC: 0.72 UIU/ML (ref 0.4–4)
WBC # BLD AUTO: 4.44 K/UL (ref 3.9–12.7)

## 2023-06-02 PROCEDURE — 85651 RBC SED RATE NONAUTOMATED: CPT

## 2023-06-02 PROCEDURE — 85025 COMPLETE CBC W/AUTO DIFF WBC: CPT

## 2023-06-02 PROCEDURE — 80204 DRUG ASSAY METHOTREXATE: CPT | Performed by: FAMILY MEDICINE

## 2023-06-02 PROCEDURE — 84403 ASSAY OF TOTAL TESTOSTERONE: CPT | Performed by: FAMILY MEDICINE

## 2023-06-02 PROCEDURE — 80053 COMPREHEN METABOLIC PANEL: CPT

## 2023-06-02 PROCEDURE — 84443 ASSAY THYROID STIM HORMONE: CPT | Performed by: FAMILY MEDICINE

## 2023-06-02 PROCEDURE — 36415 COLL VENOUS BLD VENIPUNCTURE: CPT | Mod: PO | Performed by: FAMILY MEDICINE

## 2023-06-02 PROCEDURE — 86140 C-REACTIVE PROTEIN: CPT

## 2023-06-05 ENCOUNTER — OFFICE VISIT (OUTPATIENT)
Dept: RHEUMATOLOGY | Facility: CLINIC | Age: 28
End: 2023-06-05
Payer: COMMERCIAL

## 2023-06-05 ENCOUNTER — PATIENT MESSAGE (OUTPATIENT)
Dept: INTERNAL MEDICINE | Facility: CLINIC | Age: 28
End: 2023-06-05
Payer: COMMERCIAL

## 2023-06-05 ENCOUNTER — PATIENT MESSAGE (OUTPATIENT)
Dept: RHEUMATOLOGY | Facility: CLINIC | Age: 28
End: 2023-06-05

## 2023-06-05 VITALS
WEIGHT: 157.94 LBS | BODY MASS INDEX: 23.39 KG/M2 | SYSTOLIC BLOOD PRESSURE: 117 MMHG | HEART RATE: 70 BPM | DIASTOLIC BLOOD PRESSURE: 78 MMHG | HEIGHT: 69 IN

## 2023-06-05 DIAGNOSIS — Z79.899 LONG-TERM USE OF PLAQUENIL: ICD-10-CM

## 2023-06-05 DIAGNOSIS — M25.50 POLYARTHRALGIA: ICD-10-CM

## 2023-06-05 DIAGNOSIS — Z79.899 LONG-TERM USE OF HIGH-RISK MEDICATION: ICD-10-CM

## 2023-06-05 DIAGNOSIS — Z82.62 FAMILY HISTORY OF OSTEOPOROSIS: ICD-10-CM

## 2023-06-05 DIAGNOSIS — M05.79 RHEUMATOID ARTHRITIS INVOLVING MULTIPLE SITES WITH POSITIVE RHEUMATOID FACTOR: Primary | ICD-10-CM

## 2023-06-05 DIAGNOSIS — Z72.0 TOBACCO USE: ICD-10-CM

## 2023-06-05 PROCEDURE — 3074F PR MOST RECENT SYSTOLIC BLOOD PRESSURE < 130 MM HG: ICD-10-PCS | Mod: CPTII,S$GLB,,

## 2023-06-05 PROCEDURE — 3008F PR BODY MASS INDEX (BMI) DOCUMENTED: ICD-10-PCS | Mod: CPTII,S$GLB,,

## 2023-06-05 PROCEDURE — 1160F RVW MEDS BY RX/DR IN RCRD: CPT | Mod: CPTII,S$GLB,,

## 2023-06-05 PROCEDURE — 99214 OFFICE O/P EST MOD 30 MIN: CPT | Mod: S$GLB,,,

## 2023-06-05 PROCEDURE — 1159F MED LIST DOCD IN RCRD: CPT | Mod: CPTII,S$GLB,,

## 2023-06-05 PROCEDURE — 3078F PR MOST RECENT DIASTOLIC BLOOD PRESSURE < 80 MM HG: ICD-10-PCS | Mod: CPTII,S$GLB,,

## 2023-06-05 PROCEDURE — 99999 PR PBB SHADOW E&M-EST. PATIENT-LVL III: ICD-10-PCS | Mod: PBBFAC,,,

## 2023-06-05 PROCEDURE — 3074F SYST BP LT 130 MM HG: CPT | Mod: CPTII,S$GLB,,

## 2023-06-05 PROCEDURE — 1160F PR REVIEW ALL MEDS BY PRESCRIBER/CLIN PHARMACIST DOCUMENTED: ICD-10-PCS | Mod: CPTII,S$GLB,,

## 2023-06-05 PROCEDURE — 99214 PR OFFICE/OUTPT VISIT, EST, LEVL IV, 30-39 MIN: ICD-10-PCS | Mod: S$GLB,,,

## 2023-06-05 PROCEDURE — 1159F PR MEDICATION LIST DOCUMENTED IN MEDICAL RECORD: ICD-10-PCS | Mod: CPTII,S$GLB,,

## 2023-06-05 PROCEDURE — 3008F BODY MASS INDEX DOCD: CPT | Mod: CPTII,S$GLB,,

## 2023-06-05 PROCEDURE — 99999 PR PBB SHADOW E&M-EST. PATIENT-LVL III: CPT | Mod: PBBFAC,,,

## 2023-06-05 PROCEDURE — 3078F DIAST BP <80 MM HG: CPT | Mod: CPTII,S$GLB,,

## 2023-06-05 NOTE — PROGRESS NOTES
RHEUMATOLOGY OUTPATIENT CLINIC NOTE    06/05/2023    Subjective:       Patient ID: Be Brito is a 28 y.o. male.    Chief Complaint: Rheumatoid Arthritis        HPI   Be Brito is a 28 y.o. pleasant male here for rheumatology follow up for seropositive RA.       Severe fatigue with methotrexate use. Denies any pain in his joints currently.     He still has pain in various joints (fingers, wrists, shoulder) with pain and swelling. Occasional burning sensation in his shoulders but also occurs in other joints at random, sometimes with certain movements. Overall he reports significant improvement with hydroxychloroquine 200 mg bid.     Morning stiffness is significantly down.  Denies any pain today.  Rash on hands resolved with washing work gloves.   No muscle weakness.   No fever, lymphadenopathy, no unexpected weight loss, no fatigue  No rashes on palms, no vasculitis  No shortness of breath or pleuritic chest pain.     Rheumatologic review of systems negative otherwise.     Prior therapes:  Methotrexate - severe fatigue        Physical exam No obvious synovitis, no erythema, no increased warmth to any joints benita UE/LE. Synovial thickening benita middle finger PIPs.   Nonpainful MTP squeeze.      Initial HPI:  He reports a many year (7-8) history of joint pain.  He does not like coming to the doctor's, and the pain generally goes away on its own.  However recently, the pain has been unrelenting.  Most notable in his fingers, knuckles, ankles, knees.  He reports swelling most notably in his MCPs, occasionally red during severe flares.  Also with difficulty with his  strength.  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  He reports stiffness lasting all day long.  Previously tried various over-the-counter analgesics including Tylenol, Aleve, ibuprofen without much relief.    Fam hx:  Father with osteoporosis, arthritis?    Tobacco:  Current  "smoker pack a day.  Alcohol: 2 beers a day  Denies other illicit drugs.       History reviewed. No pertinent past medical history.  Past Surgical History:   Procedure Laterality Date    TONSILLECTOMY      trigger finger injection       Family History   Problem Relation Age of Onset    Lung cancer Mother     Leukemia Father     Osteoporosis Father     Arthritis Father     Colon cancer Neg Hx     Prostate cancer Neg Hx      Social History     Socioeconomic History    Marital status: Single    Number of children: 0   Tobacco Use    Smoking status: Every Day     Packs/day: 1.00     Types: Cigarettes    Smokeless tobacco: Current   Substance and Sexual Activity    Alcohol use: Yes     Alcohol/week: 25.0 standard drinks     Types: 25 Cans of beer per week    Drug use: Never     Review of patient's allergies indicates:  No Known Allergies        Objective:   /78 (BP Location: Left arm, Patient Position: Sitting, BP Method: Medium (Automatic))   Pulse 70   Ht 5' 9" (1.753 m)   Wt 71.6 kg (157 lb 15.4 oz)   BMI 23.33 kg/m²     There is no immunization history on file for this patient.       Recent Results (from the past 672 hour(s))   CBC Auto Differential    Collection Time: 06/02/23  9:55 AM   Result Value Ref Range    WBC 4.44 3.90 - 12.70 K/uL    RBC 4.26 (L) 4.60 - 6.20 M/uL    Hemoglobin 13.5 (L) 14.0 - 18.0 g/dL    Hematocrit 40.1 40.0 - 54.0 %    MCV 94 82 - 98 fL    MCH 31.7 (H) 27.0 - 31.0 pg    MCHC 33.7 32.0 - 36.0 g/dL    RDW 12.8 11.5 - 14.5 %    Platelets 240 150 - 450 K/uL    MPV 9.6 9.2 - 12.9 fL    Immature Granulocytes 0.2 0.0 - 0.5 %    Gran # (ANC) 2.5 1.8 - 7.7 K/uL    Immature Grans (Abs) 0.01 0.00 - 0.04 K/uL    Lymph # 1.5 1.0 - 4.8 K/uL    Mono # 0.4 0.3 - 1.0 K/uL    Eos # 0.0 0.0 - 0.5 K/uL    Baso # 0.08 0.00 - 0.20 K/uL    nRBC 0 0 /100 WBC    Gran % 55.2 38.0 - 73.0 %    Lymph % 34.5 18.0 - 48.0 %    Mono % 8.3 4.0 - 15.0 %    Eosinophil % 0.0 0.0 - 8.0 %    Basophil % 1.8 0.0 - 1.9 % "    Differential Method Automated    Comprehensive Metabolic Panel    Collection Time: 06/02/23  9:55 AM   Result Value Ref Range    Sodium 137 136 - 145 mmol/L    Potassium 4.1 3.5 - 5.1 mmol/L    Chloride 105 95 - 110 mmol/L    CO2 27 23 - 29 mmol/L    Glucose 91 70 - 110 mg/dL    BUN 13 6 - 20 mg/dL    Creatinine 0.8 0.5 - 1.4 mg/dL    Calcium 9.4 8.7 - 10.5 mg/dL    Total Protein 7.3 6.0 - 8.4 g/dL    Albumin 4.3 3.5 - 5.2 g/dL    Total Bilirubin 1.5 (H) 0.1 - 1.0 mg/dL    Alkaline Phosphatase 43 (L) 55 - 135 U/L    AST 20 10 - 40 U/L    ALT 13 10 - 44 U/L    Anion Gap 5 (L) 8 - 16 mmol/L    eGFR >60 >60 mL/min/1.73 m^2   C-Reactive Protein    Collection Time: 06/02/23  9:55 AM   Result Value Ref Range    CRP 1.9 0.0 - 8.2 mg/L   Sedimentation rate    Collection Time: 06/02/23  9:55 AM   Result Value Ref Range    Sed Rate 3 0 - 10 mm/Hr   TSH    Collection Time: 06/02/23  9:55 AM   Result Value Ref Range    TSH 0.721 0.400 - 4.000 uIU/mL   METHOTREXATE LEVEL    Collection Time: 06/02/23  9:55 AM   Result Value Ref Range    Methotrexate Lvl <0.04 (L) 0.50 - 5.00 umol/L   TESTOSTERONE    Collection Time: 06/02/23  9:55 AM   Result Value Ref Range    Testosterone, Total 704 304 - 1227 ng/dL          Lab Results   Component Value Date    TBGOLDPLUS Negative 07/21/2022      Lab Results   Component Value Date    HEPBCAB Negative 07/21/2022    HEPCAB Negative 07/21/2022      Bilateral hand x-ray 07/21/2022  No acute fracture or dislocation.  Joint spaces appear to be relatively well maintained.  No erosive changes are identified.    Bilateral foot x-ray 07/21/2022  FINDINGS:  No acute fracture or dislocation.  There appears to be some possible soft tissue swelling adjacent to either 5th metatarsal head.  No erosive changes are identified.  Joint spaces appear to be relatively well maintained.     Assessment:       1. Rheumatoid arthritis involving multiple sites with positive rheumatoid factor    2. Long-term use of  high-risk medication    3. Long-term use of Plaquenil    4. Polyarthralgia    5. Tobacco use    6. Family history of osteoporosis                Impression:   Rheumatoid arthritis with positive rheumatoid factor and polyarthralgias  Rheumatoid factor 340, elevated sed rate at 24, CCP 98.8. Prolonged morning stiffness lasting all day, polyarthralgias, swelling multiple joints.  Synovitis present bilateral hands, painful MTP squeeze.  Previously tried over-the-counter analgesics.  Family history with some form of arthritis in his father, also osteoporosis.  Denies interest in family planning at this time.  We discussed risks of fetal toxicity with certain immunosuppressant medications.  Advised patient to let us know if at any point while on immunosuppressant therapy if he and his spouse would be trying for family planning.  80% improvement with Plaquenil once daily.  Still with frequent swelling multiple digits bilateral hands and prolonged stiffness.  Improvement in symptoms with hydroxychloroquine 200 mg bid > 50% from baseline.  Mtx with severe fatigue.     Tobacco use  Current smoker 1 pack a day  Not interested in cessation    Family history osteoporosis  In father.  Also with patient with rheumatoid arthritis with med at increased risk for developing osteoporosis.    Rash in bilateral hands   Patient showed me photos on his bone of pinpoint red spots bilateral hands.  Occurs every time after opening new pair of gloves at work.  Improved after wearing them for a few days.  Possibly contact dermatitis.  Resolved with washing gloves      Plan:           RA:  Continue Plaquenil to 200 mg twice daily.  Annual eye exams while on Plaquenil  Discontinue  mtx (2/2023 - 6/2023)  Consider ssz 500 mg daily as next option if continued pain/stiffness  Advised patient let me know of any flares prior to next appointment.    Okay for prednisone p.r.n. for flares.  Discussed side effects and risks of this with patient, would  like to avoid frequent use.    Consider shoulder xray/EMG if burning sensation continues    Tobacco use  Recommend stopping smoking        Patient would prefer oral medication over injections as he is not comfortable with needles.    Discussed need for medication safety monitoring while on DMARD or other immunosuppressant.      Follow up 4 months with reg4 early in [place]  Patient schedules follow up via Rangel Barragan PA-C  [facility]       30 minutes of total time spent on the encounter, which includes face to face time and non-face to face time preparing to see the patient (eg, review of tests), Obtaining and/or reviewing separately obtained history, Documenting clinical information in the electronic or other health record, Independently interpreting results (not separately reported) and communicating results to the patient/family/caregiver, or Care coordination (not separately reported).    Disclaimer: This note was prepared using voice recognition system and is likely to have sound alike errors and is not proof read.  Please call me with any questions

## 2023-08-21 ENCOUNTER — OFFICE VISIT (OUTPATIENT)
Dept: OTOLARYNGOLOGY | Facility: CLINIC | Age: 28
End: 2023-08-21
Payer: COMMERCIAL

## 2023-08-21 ENCOUNTER — CLINICAL SUPPORT (OUTPATIENT)
Dept: AUDIOLOGY | Facility: CLINIC | Age: 28
End: 2023-08-21
Payer: COMMERCIAL

## 2023-08-21 VITALS — WEIGHT: 170.19 LBS | BODY MASS INDEX: 25.21 KG/M2 | HEIGHT: 69 IN

## 2023-08-21 DIAGNOSIS — H61.21 RIGHT EAR IMPACTED CERUMEN: ICD-10-CM

## 2023-08-21 DIAGNOSIS — H91.93 SUBJECTIVE HEARING CHANGE OF BOTH EARS: ICD-10-CM

## 2023-08-21 DIAGNOSIS — H93.292 ABNORMAL AUDITORY PERCEPTION OF LEFT EAR: Primary | ICD-10-CM

## 2023-08-21 DIAGNOSIS — J30.89 NON-SEASONAL ALLERGIC RHINITIS, UNSPECIFIED TRIGGER: Primary | ICD-10-CM

## 2023-08-21 PROCEDURE — 99204 PR OFFICE/OUTPT VISIT, NEW, LEVL IV, 45-59 MIN: ICD-10-PCS | Mod: 25,S$GLB,, | Performed by: OTOLARYNGOLOGY

## 2023-08-21 PROCEDURE — 99999 PR PBB SHADOW E&M-EST. PATIENT-LVL III: CPT | Mod: PBBFAC,,, | Performed by: OTOLARYNGOLOGY

## 2023-08-21 PROCEDURE — 99999 PR PBB SHADOW E&M-EST. PATIENT-LVL I: ICD-10-PCS | Mod: PBBFAC,,,

## 2023-08-21 PROCEDURE — 99999 PR PBB SHADOW E&M-EST. PATIENT-LVL I: CPT | Mod: PBBFAC,,,

## 2023-08-21 PROCEDURE — 92567 PR TYMPA2METRY: ICD-10-PCS | Mod: S$GLB,,,

## 2023-08-21 PROCEDURE — 3008F PR BODY MASS INDEX (BMI) DOCUMENTED: ICD-10-PCS | Mod: CPTII,S$GLB,, | Performed by: OTOLARYNGOLOGY

## 2023-08-21 PROCEDURE — 3008F BODY MASS INDEX DOCD: CPT | Mod: CPTII,S$GLB,, | Performed by: OTOLARYNGOLOGY

## 2023-08-21 PROCEDURE — 1159F PR MEDICATION LIST DOCUMENTED IN MEDICAL RECORD: ICD-10-PCS | Mod: CPTII,S$GLB,, | Performed by: OTOLARYNGOLOGY

## 2023-08-21 PROCEDURE — 69210 PR REMOVAL IMPACTED CERUMEN REQUIRING INSTRUMENTATION, UNILATERAL: ICD-10-PCS | Mod: S$GLB,,, | Performed by: OTOLARYNGOLOGY

## 2023-08-21 PROCEDURE — 99999 PR PBB SHADOW E&M-EST. PATIENT-LVL III: ICD-10-PCS | Mod: PBBFAC,,, | Performed by: OTOLARYNGOLOGY

## 2023-08-21 PROCEDURE — 1160F PR REVIEW ALL MEDS BY PRESCRIBER/CLIN PHARMACIST DOCUMENTED: ICD-10-PCS | Mod: CPTII,S$GLB,, | Performed by: OTOLARYNGOLOGY

## 2023-08-21 PROCEDURE — 1160F RVW MEDS BY RX/DR IN RCRD: CPT | Mod: CPTII,S$GLB,, | Performed by: OTOLARYNGOLOGY

## 2023-08-21 PROCEDURE — 92567 TYMPANOMETRY: CPT | Mod: S$GLB,,,

## 2023-08-21 PROCEDURE — 1159F MED LIST DOCD IN RCRD: CPT | Mod: CPTII,S$GLB,, | Performed by: OTOLARYNGOLOGY

## 2023-08-21 PROCEDURE — 69210 REMOVE IMPACTED EAR WAX UNI: CPT | Mod: S$GLB,,, | Performed by: OTOLARYNGOLOGY

## 2023-08-21 PROCEDURE — 99204 OFFICE O/P NEW MOD 45 MIN: CPT | Mod: 25,S$GLB,, | Performed by: OTOLARYNGOLOGY

## 2023-08-21 PROCEDURE — 92553 PR AUDIOMETRY, AIR & BONE: ICD-10-PCS | Mod: S$GLB,,,

## 2023-08-21 PROCEDURE — 92553 AUDIOMETRY AIR & BONE: CPT | Mod: S$GLB,,,

## 2023-08-21 RX ORDER — AZELASTINE HYDROCHLORIDE, FLUTICASONE PROPIONATE 137; 50 UG/1; UG/1
2 SPRAY, METERED NASAL DAILY
Qty: 23 G | Refills: 5 | Status: SHIPPED | OUTPATIENT
Start: 2023-08-21 | End: 2023-08-25

## 2023-08-21 NOTE — PROGRESS NOTES
"Referring Provider:    Self, Aaareferral  No address on file  Subjective:   Patient: Be Brito [id number], :1995   Visit date:2023 8:43 AM    Chief Complaint:  Ear Fullness (Both ears x 6 months )    HPI:    Prior notes reviewed by myself.  Clinical documentation obtained by nursing staff reviewed.     29 y/o gentleman here for evaluation of right greater than left hearing loss that has been present for at least 6 months.  He also reports bilateral nonpulsatile tinnitus.  No prior history of otologic surgery or trauma.  He does have an extensive loud noise exposure history.  He reports lifelong issues with his sinus in nasal cavity.  Symptoms include sneezing, rhinorrhea, congestion, postnasal drip, hyposmia.  He has tried Flonase in the past but did not feel significant improvement.  He has had difficulty tolerating oral antihistamines due to drowsiness.  No recent allergy testing or imaging.      Objective:     Physical Exam:  Vitals:  Ht 5' 9" (1.753 m)   Wt 77.2 kg (170 lb 3.1 oz)   BMI 25.13 kg/m²   General appearance:  Well developed, well nourished    Ears:  Otoscopy of external auditory canals and tympanic membranes was normal bilaterally but with 100% cerumen impaction right side, clinical speech reception thresholds grossly intact, no mass/lesion of auricle.    Nose:  No masses/lesions of external nose, nasal mucosa, septum, and turbinates were within normal limits.    Mouth:  No mass/lesion of lips, teeth, gums, hard/soft palate, tongue, tonsils, or oropharynx.    Neck & Lymphatics:  No cervical lymphadenopathy, no neck mass/crepitus/ asymmetry, trachea is midline, no thyroid enlargement/tenderness/mass.        [x]  Data Reviewed:    Lab Results   Component Value Date    WBC 4.44 2023    HGB 13.5 (L) 2023    HCT 40.1 2023    MCV 94 2023    EOSINOPHIL 0.0 2023     Procedure Note    CHIEF COMPLAINT:  Cerumen Impaction    Description:  The patient was seated " in an exam chair.  An ear speculum was placed in the right EAC and was examined under the microscope.  Suction and/or loop curettes were used to remove a large cerumen impaction.  The tympanic membrane was visualized and was normal in appearance.  The patient tolerated the procedure well.      Reviewed Audio results - [facility]      Assessment & Plan:   Non-seasonal allergic rhinitis, unspecified trigger  -     azelastine-fluticasone (DYMISTA) 137-50 mcg/spray Spry nassal spray; 2 sprays by Each Nostril route once daily.  Dispense: 23 g; Refill: 5    Right ear impacted cerumen    Subjective hearing change of both ears        We reviewed his history, exam and audiogram in detail.  We removed his cerumen impaction from his right ear which relieved his hearing issues on that side.  His audiogram was normal.  I recommended that we try him on Dymista for his allergic rhinitis symptoms as I think this would mitigate the issue he has with antihistamines and drowsiness.  He will follow up as needed.

## 2023-08-21 NOTE — PROGRESS NOTES
Be Brito was seen 08/21/2023 for an audiological evaluation following cerumen removal by ENT. Patient reported he can now hear better out of his right ear than his left ear. He has a history of occupational noise exposure. He noted he is starting to notice tinnitus at night. He denied a family history of hearing loss and vertigo.     Otoscopy revealed clear canals with visualization of the tympanic membrane in both ears. Tympanograms were Type A for the right ear and Type A for the left ear. Audiometry revealed normal hearing sensitivity in both ears.     Patient was counseled on the above findings.    Recommendations:  Follow-up with ENT, as scheduled.  Repeat audiological evaluation as needed.

## 2023-08-24 ENCOUNTER — PATIENT MESSAGE (OUTPATIENT)
Dept: OTOLARYNGOLOGY | Facility: CLINIC | Age: 28
End: 2023-08-24
Payer: COMMERCIAL

## 2023-08-25 DIAGNOSIS — J30.89 NON-SEASONAL ALLERGIC RHINITIS, UNSPECIFIED TRIGGER: Primary | ICD-10-CM

## 2023-08-25 RX ORDER — AZELASTINE HYDROCHLORIDE, FLUTICASONE PROPIONATE 137; 50 UG/1; UG/1
2 SPRAY, METERED NASAL DAILY
Qty: 23 G | Refills: 5 | Status: SHIPPED | OUTPATIENT
Start: 2023-08-25

## 2023-09-05 ENCOUNTER — LAB VISIT (OUTPATIENT)
Dept: LAB | Facility: HOSPITAL | Age: 28
End: 2023-09-05
Payer: COMMERCIAL

## 2023-09-05 DIAGNOSIS — Z79.899 LONG-TERM USE OF PLAQUENIL: ICD-10-CM

## 2023-09-05 LAB
ALBUMIN SERPL BCP-MCNC: 4.2 G/DL (ref 3.5–5.2)
ALP SERPL-CCNC: 48 U/L (ref 55–135)
ALT SERPL W/O P-5'-P-CCNC: 8 U/L (ref 10–44)
ANION GAP SERPL CALC-SCNC: 13 MMOL/L (ref 8–16)
AST SERPL-CCNC: 21 U/L (ref 10–40)
BASOPHILS # BLD AUTO: 0.04 K/UL (ref 0–0.2)
BASOPHILS NFR BLD: 0.8 % (ref 0–1.9)
BILIRUB SERPL-MCNC: 1.1 MG/DL (ref 0.1–1)
BUN SERPL-MCNC: 8 MG/DL (ref 6–20)
CALCIUM SERPL-MCNC: 9.6 MG/DL (ref 8.7–10.5)
CHLORIDE SERPL-SCNC: 104 MMOL/L (ref 95–110)
CO2 SERPL-SCNC: 23 MMOL/L (ref 23–29)
CREAT SERPL-MCNC: 0.9 MG/DL (ref 0.5–1.4)
CRP SERPL-MCNC: 3.1 MG/L (ref 0–8.2)
DIFFERENTIAL METHOD: ABNORMAL
EOSINOPHIL # BLD AUTO: 0 K/UL (ref 0–0.5)
EOSINOPHIL NFR BLD: 0 % (ref 0–8)
ERYTHROCYTE [DISTWIDTH] IN BLOOD BY AUTOMATED COUNT: 12.4 % (ref 11.5–14.5)
ERYTHROCYTE [SEDIMENTATION RATE] IN BLOOD BY WESTERGREN METHOD: 11 MM/HR (ref 0–10)
EST. GFR  (NO RACE VARIABLE): >60 ML/MIN/1.73 M^2
GLUCOSE SERPL-MCNC: 95 MG/DL (ref 70–110)
HCT VFR BLD AUTO: 43.2 % (ref 40–54)
HGB BLD-MCNC: 14.5 G/DL (ref 14–18)
IMM GRANULOCYTES # BLD AUTO: 0.01 K/UL (ref 0–0.04)
IMM GRANULOCYTES NFR BLD AUTO: 0.2 % (ref 0–0.5)
LYMPHOCYTES # BLD AUTO: 1.4 K/UL (ref 1–4.8)
LYMPHOCYTES NFR BLD: 28.6 % (ref 18–48)
MCH RBC QN AUTO: 31.8 PG (ref 27–31)
MCHC RBC AUTO-ENTMCNC: 33.6 G/DL (ref 32–36)
MCV RBC AUTO: 95 FL (ref 82–98)
MONOCYTES # BLD AUTO: 0.4 K/UL (ref 0.3–1)
MONOCYTES NFR BLD: 7.7 % (ref 4–15)
NEUTROPHILS # BLD AUTO: 3 K/UL (ref 1.8–7.7)
NEUTROPHILS NFR BLD: 62.7 % (ref 38–73)
NRBC BLD-RTO: 0 /100 WBC
PLATELET # BLD AUTO: 216 K/UL (ref 150–450)
PMV BLD AUTO: 10 FL (ref 9.2–12.9)
POTASSIUM SERPL-SCNC: 4.2 MMOL/L (ref 3.5–5.1)
PROT SERPL-MCNC: 7.5 G/DL (ref 6–8.4)
RBC # BLD AUTO: 4.56 M/UL (ref 4.6–6.2)
SODIUM SERPL-SCNC: 140 MMOL/L (ref 136–145)
WBC # BLD AUTO: 4.79 K/UL (ref 3.9–12.7)

## 2023-09-05 PROCEDURE — 85025 COMPLETE CBC W/AUTO DIFF WBC: CPT

## 2023-09-05 PROCEDURE — 80053 COMPREHEN METABOLIC PANEL: CPT

## 2023-09-05 PROCEDURE — 86140 C-REACTIVE PROTEIN: CPT

## 2023-09-05 PROCEDURE — 85651 RBC SED RATE NONAUTOMATED: CPT

## 2023-09-05 PROCEDURE — 36415 COLL VENOUS BLD VENIPUNCTURE: CPT | Mod: PO

## 2023-09-11 ENCOUNTER — OFFICE VISIT (OUTPATIENT)
Dept: RHEUMATOLOGY | Facility: CLINIC | Age: 28
End: 2023-09-11
Payer: COMMERCIAL

## 2023-09-11 VITALS
WEIGHT: 156.94 LBS | BODY MASS INDEX: 23.25 KG/M2 | RESPIRATION RATE: 16 BRPM | HEART RATE: 84 BPM | HEIGHT: 69 IN | DIASTOLIC BLOOD PRESSURE: 96 MMHG | SYSTOLIC BLOOD PRESSURE: 147 MMHG

## 2023-09-11 DIAGNOSIS — Z72.0 TOBACCO USE: Primary | ICD-10-CM

## 2023-09-11 DIAGNOSIS — Z51.81 MEDICATION MONITORING ENCOUNTER: ICD-10-CM

## 2023-09-11 DIAGNOSIS — Z82.62 FAMILY HISTORY OF OSTEOPOROSIS: ICD-10-CM

## 2023-09-11 DIAGNOSIS — M05.79 RHEUMATOID ARTHRITIS INVOLVING MULTIPLE SITES WITH POSITIVE RHEUMATOID FACTOR: ICD-10-CM

## 2023-09-11 DIAGNOSIS — Z79.899 LONG-TERM USE OF PLAQUENIL: ICD-10-CM

## 2023-09-11 PROCEDURE — 3077F PR MOST RECENT SYSTOLIC BLOOD PRESSURE >= 140 MM HG: ICD-10-PCS | Mod: CPTII,S$GLB,,

## 2023-09-11 PROCEDURE — 99999 PR PBB SHADOW E&M-EST. PATIENT-LVL III: CPT | Mod: PBBFAC,,,

## 2023-09-11 PROCEDURE — 3008F PR BODY MASS INDEX (BMI) DOCUMENTED: ICD-10-PCS | Mod: CPTII,S$GLB,,

## 2023-09-11 PROCEDURE — 3077F SYST BP >= 140 MM HG: CPT | Mod: CPTII,S$GLB,,

## 2023-09-11 PROCEDURE — 1160F PR REVIEW ALL MEDS BY PRESCRIBER/CLIN PHARMACIST DOCUMENTED: ICD-10-PCS | Mod: CPTII,S$GLB,,

## 2023-09-11 PROCEDURE — 3008F BODY MASS INDEX DOCD: CPT | Mod: CPTII,S$GLB,,

## 2023-09-11 PROCEDURE — 99214 OFFICE O/P EST MOD 30 MIN: CPT | Mod: S$GLB,,,

## 2023-09-11 PROCEDURE — 3080F PR MOST RECENT DIASTOLIC BLOOD PRESSURE >= 90 MM HG: ICD-10-PCS | Mod: CPTII,S$GLB,,

## 2023-09-11 PROCEDURE — 3080F DIAST BP >= 90 MM HG: CPT | Mod: CPTII,S$GLB,,

## 2023-09-11 PROCEDURE — 1159F PR MEDICATION LIST DOCUMENTED IN MEDICAL RECORD: ICD-10-PCS | Mod: CPTII,S$GLB,,

## 2023-09-11 PROCEDURE — 99214 PR OFFICE/OUTPT VISIT, EST, LEVL IV, 30-39 MIN: ICD-10-PCS | Mod: S$GLB,,,

## 2023-09-11 PROCEDURE — 99999 PR PBB SHADOW E&M-EST. PATIENT-LVL III: ICD-10-PCS | Mod: PBBFAC,,,

## 2023-09-11 PROCEDURE — 1159F MED LIST DOCD IN RCRD: CPT | Mod: CPTII,S$GLB,,

## 2023-09-11 PROCEDURE — 1160F RVW MEDS BY RX/DR IN RCRD: CPT | Mod: CPTII,S$GLB,,

## 2023-09-11 RX ORDER — HYDROXYCHLOROQUINE SULFATE 200 MG/1
200 TABLET, FILM COATED ORAL 2 TIMES DAILY
Qty: 180 TABLET | Refills: 1 | Status: SHIPPED | OUTPATIENT
Start: 2023-09-11

## 2023-09-11 NOTE — PROGRESS NOTES
RHEUMATOLOGY OUTPATIENT CLINIC NOTE    09/11/2023    Subjective:       Patient ID: Be Brito is a 28 y.o. male.    Chief Complaint: Rheumatoid Arthritis        HPI   Be Brito is a 28 y.o. pleasant male here for rheumatology follow up for seropositive RA.     Joints doing well on hydroxychloroquine monotherapy.  Fatigue improved since discontinuing methotrexate.  Since last appointment had COVID, flare of joint pain for 1 day improved on its own.  Uses Tylenol/ibuprofen p.r.n. for joint flares which are seldom.  States he went to the eye doctor since last appointment for Plaquenil screening      Morning stiffness is significantly down.  Denies any pain today.  No muscle weakness.   No fever, lymphadenopathy, no unexpected weight loss, no fatigue  No rashes on palms, no vasculitis  No shortness of breath or pleuritic chest pain.     Rheumatologic review of systems negative otherwise.     Prior therapes:  Methotrexate - severe fatigue    Current therapy:  Hydroxychloroquine 200 mg daily    Physical exam:  No obvious synovitis, no erythema, no increased warmth to any joints benita UE/LE. Synovial thickening benita middle finger PIPs.   Nonpainful MTP squeeze.      Initial HPI:  He reports a many year (7-8) history of joint pain.  He does not like coming to the doctor's, and the pain generally goes away on its own.  However recently, the pain has been unrelenting.  Most notable in his fingers, knuckles, ankles, knees.  He reports swelling most notably in his MCPs, occasionally red during severe flares.  Also with difficulty with his  strength.  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  He reports stiffness lasting all day long.  Previously tried various over-the-counter analgesics including Tylenol, Aleve, ibuprofen without much relief.    Fam hx:  Father with osteoporosis, arthritis?    Tobacco:  Current smoker pack a day.  Alcohol: 2 beers a  "day  Denies other illicit drugs.       Objective:   BP (!) 147/96 (BP Location: Right arm, Patient Position: Sitting, BP Method: Large (Automatic))   Pulse 84   Resp 16   Ht 5' 9" (1.753 m)   Wt 71.2 kg (156 lb 15.5 oz)   BMI 23.18 kg/m²     There is no immunization history on file for this patient.       Recent Results (from the past 672 hour(s))   CBC Auto Differential    Collection Time: 09/05/23  9:50 AM   Result Value Ref Range    WBC 4.79 3.90 - 12.70 K/uL    RBC 4.56 (L) 4.60 - 6.20 M/uL    Hemoglobin 14.5 14.0 - 18.0 g/dL    Hematocrit 43.2 40.0 - 54.0 %    MCV 95 82 - 98 fL    MCH 31.8 (H) 27.0 - 31.0 pg    MCHC 33.6 32.0 - 36.0 g/dL    RDW 12.4 11.5 - 14.5 %    Platelets 216 150 - 450 K/uL    MPV 10.0 9.2 - 12.9 fL    Immature Granulocytes 0.2 0.0 - 0.5 %    Gran # (ANC) 3.0 1.8 - 7.7 K/uL    Immature Grans (Abs) 0.01 0.00 - 0.04 K/uL    Lymph # 1.4 1.0 - 4.8 K/uL    Mono # 0.4 0.3 - 1.0 K/uL    Eos # 0.0 0.0 - 0.5 K/uL    Baso # 0.04 0.00 - 0.20 K/uL    nRBC 0 0 /100 WBC    Gran % 62.7 38.0 - 73.0 %    Lymph % 28.6 18.0 - 48.0 %    Mono % 7.7 4.0 - 15.0 %    Eosinophil % 0.0 0.0 - 8.0 %    Basophil % 0.8 0.0 - 1.9 %    Differential Method Automated    Comprehensive Metabolic Panel    Collection Time: 09/05/23  9:50 AM   Result Value Ref Range    Sodium 140 136 - 145 mmol/L    Potassium 4.2 3.5 - 5.1 mmol/L    Chloride 104 95 - 110 mmol/L    CO2 23 23 - 29 mmol/L    Glucose 95 70 - 110 mg/dL    BUN 8 6 - 20 mg/dL    Creatinine 0.9 0.5 - 1.4 mg/dL    Calcium 9.6 8.7 - 10.5 mg/dL    Total Protein 7.5 6.0 - 8.4 g/dL    Albumin 4.2 3.5 - 5.2 g/dL    Total Bilirubin 1.1 (H) 0.1 - 1.0 mg/dL    Alkaline Phosphatase 48 (L) 55 - 135 U/L    AST 21 10 - 40 U/L    ALT 8 (L) 10 - 44 U/L    eGFR >60 >60 mL/min/1.73 m^2    Anion Gap 13 8 - 16 mmol/L   C-Reactive Protein    Collection Time: 09/05/23  9:50 AM   Result Value Ref Range    CRP 3.1 0.0 - 8.2 mg/L   Sedimentation rate    Collection Time: 09/05/23  9:50 AM "   Result Value Ref Range    Sed Rate 11 (H) 0 - 10 mm/Hr          Lab Results   Component Value Date    TBGOLDPLUS Negative 07/21/2022      Lab Results   Component Value Date    HEPBCAB Negative 07/21/2022    HEPCAB Negative 07/21/2022      Bilateral hand x-ray 07/21/2022  No acute fracture or dislocation.  Joint spaces appear to be relatively well maintained.  No erosive changes are identified.    Bilateral foot x-ray 07/21/2022  FINDINGS:  No acute fracture or dislocation.  There appears to be some possible soft tissue swelling adjacent to either 5th metatarsal head.  No erosive changes are identified.  Joint spaces appear to be relatively well maintained.     Assessment:       1. Tobacco use    2. Rheumatoid arthritis involving multiple sites with positive rheumatoid factor    3. Long-term use of Plaquenil    4. Family history of osteoporosis    5. Medication monitoring encounter          Impression:   Rheumatoid arthritis with positive rheumatoid factor and polyarthralgias  Rheumatoid factor 340, elevated sed rate at 24, CCP 98.8. Prolonged morning stiffness lasting all day, polyarthralgias, swelling multiple joints.  Synovitis present bilateral hands, painful MTP squeeze.  Previously tried over-the-counter analgesics.  Family history with some form of arthritis in his father, also osteoporosis.  Denies interest in family planning at this time.  We discussed risks of fetal toxicity with certain immunosuppressant medications.  Advised patient to let us know if at any point while on immunosuppressant therapy if he and his spouse would be trying for family planning.  80% improvement with Plaquenil once daily.    Mtx with severe fatigue.     Tobacco use  Current smoker 1 pack a day  Not interested in cessation    Family history osteoporosis  In father.  Also with patient with rheumatoid arthritis with med at increased risk for developing osteoporosis.    Medication monitoring:  Labs reviewed and stable.    Plan:            RA:  Continue Plaquenil to 200 mg twice daily.  Annual eye exams while on Plaquenil  Consider ssz 500 mg daily as next option if continued pain/stiffness  Advised patient let me know of any flares prior to next appointment.    Okay for prednisone p.r.n. for flares.  Discussed side effects and risks of this with patient, would like to avoid frequent use.    Consider shoulder xray/EMG if burning sensation continues    Tobacco use  Recommend stopping smoking      Patient would prefer oral medication over injections as he is not comfortable with needles.    Discussed need for medication safety monitoring while on DMARD or other immunosuppressant.      Follow up 4 months with reg4 early in [place]  Patient schedules follow up via Rangel Barragan PA-C  [facility]  Rheumatology          Disclaimer: This note was prepared using voice recognition system and is likely to have sound alike errors and is not proof read.  Please call me with any questions

## 2023-12-29 ENCOUNTER — PATIENT MESSAGE (OUTPATIENT)
Dept: INTERNAL MEDICINE | Facility: CLINIC | Age: 28
End: 2023-12-29
Payer: COMMERCIAL

## 2024-01-04 ENCOUNTER — OFFICE VISIT (OUTPATIENT)
Dept: INTERNAL MEDICINE | Facility: CLINIC | Age: 29
End: 2024-01-04
Payer: COMMERCIAL

## 2024-01-04 ENCOUNTER — LAB VISIT (OUTPATIENT)
Dept: LAB | Facility: HOSPITAL | Age: 29
End: 2024-01-04
Payer: COMMERCIAL

## 2024-01-04 VITALS
SYSTOLIC BLOOD PRESSURE: 136 MMHG | HEIGHT: 69 IN | HEART RATE: 79 BPM | DIASTOLIC BLOOD PRESSURE: 80 MMHG | BODY MASS INDEX: 24.16 KG/M2 | WEIGHT: 163.13 LBS | TEMPERATURE: 99 F | OXYGEN SATURATION: 98 %

## 2024-01-04 DIAGNOSIS — R09.1 PLEURISY: ICD-10-CM

## 2024-01-04 DIAGNOSIS — R07.1 CHEST PAIN ON BREATHING: Primary | ICD-10-CM

## 2024-01-04 DIAGNOSIS — Z79.899 LONG-TERM USE OF PLAQUENIL: ICD-10-CM

## 2024-01-04 DIAGNOSIS — R07.1 CHEST PAIN ON BREATHING: ICD-10-CM

## 2024-01-04 LAB
ALBUMIN SERPL BCP-MCNC: 4 G/DL (ref 3.5–5.2)
ALP SERPL-CCNC: 58 U/L (ref 55–135)
ALT SERPL W/O P-5'-P-CCNC: 13 U/L (ref 10–44)
ANION GAP SERPL CALC-SCNC: 11 MMOL/L (ref 8–16)
AST SERPL-CCNC: 21 U/L (ref 10–40)
BASOPHILS # BLD AUTO: 0.06 K/UL (ref 0–0.2)
BASOPHILS NFR BLD: 1.3 % (ref 0–1.9)
BILIRUB SERPL-MCNC: 0.7 MG/DL (ref 0.1–1)
BUN SERPL-MCNC: 10 MG/DL (ref 6–20)
CALCIUM SERPL-MCNC: 9.4 MG/DL (ref 8.7–10.5)
CHLORIDE SERPL-SCNC: 105 MMOL/L (ref 95–110)
CO2 SERPL-SCNC: 25 MMOL/L (ref 23–29)
CREAT SERPL-MCNC: 0.9 MG/DL (ref 0.5–1.4)
CRP SERPL-MCNC: 8.1 MG/L (ref 0–8.2)
D DIMER PPP IA.FEU-MCNC: 0.22 MG/L FEU
DIFFERENTIAL METHOD BLD: ABNORMAL
EOSINOPHIL # BLD AUTO: 0 K/UL (ref 0–0.5)
EOSINOPHIL NFR BLD: 0.6 % (ref 0–8)
ERYTHROCYTE [DISTWIDTH] IN BLOOD BY AUTOMATED COUNT: 11.7 % (ref 11.5–14.5)
ERYTHROCYTE [SEDIMENTATION RATE] IN BLOOD BY WESTERGREN METHOD: 15 MM/HR (ref 0–10)
EST. GFR  (NO RACE VARIABLE): >60 ML/MIN/1.73 M^2
GLUCOSE SERPL-MCNC: 92 MG/DL (ref 70–110)
HCT VFR BLD AUTO: 41.3 % (ref 40–54)
HGB BLD-MCNC: 14 G/DL (ref 14–18)
IMM GRANULOCYTES # BLD AUTO: 0.02 K/UL (ref 0–0.04)
IMM GRANULOCYTES NFR BLD AUTO: 0.4 % (ref 0–0.5)
LYMPHOCYTES # BLD AUTO: 1.4 K/UL (ref 1–4.8)
LYMPHOCYTES NFR BLD: 29 % (ref 18–48)
MCH RBC QN AUTO: 31.4 PG (ref 27–31)
MCHC RBC AUTO-ENTMCNC: 33.9 G/DL (ref 32–36)
MCV RBC AUTO: 93 FL (ref 82–98)
MONOCYTES # BLD AUTO: 0.4 K/UL (ref 0.3–1)
MONOCYTES NFR BLD: 7.5 % (ref 4–15)
NEUTROPHILS # BLD AUTO: 2.9 K/UL (ref 1.8–7.7)
NEUTROPHILS NFR BLD: 61.2 % (ref 38–73)
NRBC BLD-RTO: 0 /100 WBC
PLATELET # BLD AUTO: 275 K/UL (ref 150–450)
PMV BLD AUTO: 9.6 FL (ref 9.2–12.9)
POTASSIUM SERPL-SCNC: 4.4 MMOL/L (ref 3.5–5.1)
PROT SERPL-MCNC: 7.2 G/DL (ref 6–8.4)
RBC # BLD AUTO: 4.46 M/UL (ref 4.6–6.2)
SODIUM SERPL-SCNC: 141 MMOL/L (ref 136–145)
TROPONIN I SERPL DL<=0.01 NG/ML-MCNC: <0.006 NG/ML (ref 0–0.03)
WBC # BLD AUTO: 4.66 K/UL (ref 3.9–12.7)

## 2024-01-04 PROCEDURE — 85379 FIBRIN DEGRADATION QUANT: CPT

## 2024-01-04 PROCEDURE — 3008F BODY MASS INDEX DOCD: CPT | Mod: CPTII,S$GLB,,

## 2024-01-04 PROCEDURE — 85651 RBC SED RATE NONAUTOMATED: CPT

## 2024-01-04 PROCEDURE — 86140 C-REACTIVE PROTEIN: CPT

## 2024-01-04 PROCEDURE — 3079F DIAST BP 80-89 MM HG: CPT | Mod: CPTII,S$GLB,,

## 2024-01-04 PROCEDURE — 99214 OFFICE O/P EST MOD 30 MIN: CPT | Mod: S$GLB,,,

## 2024-01-04 PROCEDURE — 3075F SYST BP GE 130 - 139MM HG: CPT | Mod: CPTII,S$GLB,,

## 2024-01-04 PROCEDURE — 1160F RVW MEDS BY RX/DR IN RCRD: CPT | Mod: CPTII,S$GLB,,

## 2024-01-04 PROCEDURE — 80053 COMPREHEN METABOLIC PANEL: CPT

## 2024-01-04 PROCEDURE — 84484 ASSAY OF TROPONIN QUANT: CPT

## 2024-01-04 PROCEDURE — 99999 PR PBB SHADOW E&M-EST. PATIENT-LVL IV: CPT | Mod: PBBFAC,,,

## 2024-01-04 PROCEDURE — 1159F MED LIST DOCD IN RCRD: CPT | Mod: CPTII,S$GLB,,

## 2024-01-04 PROCEDURE — 85025 COMPLETE CBC W/AUTO DIFF WBC: CPT

## 2024-01-04 PROCEDURE — 36415 COLL VENOUS BLD VENIPUNCTURE: CPT

## 2024-01-04 RX ORDER — NAPROXEN 500 MG/1
500 TABLET ORAL
COMMUNITY
Start: 2023-12-29 | End: 2024-01-04 | Stop reason: SDUPTHER

## 2024-01-04 RX ORDER — NAPROXEN 500 MG/1
500 TABLET ORAL 2 TIMES DAILY WITH MEALS
Qty: 60 TABLET | Refills: 1 | Status: SHIPPED | OUTPATIENT
Start: 2024-01-04

## 2024-01-04 NOTE — PROGRESS NOTES
"Be Brito  01/04/2024  [id number]    Lawanda Martinez MD  Patient Care Team:  Lawanda Martinez MD as PCP - General (Family Medicine)          Visit Type: " follow up     Chief Complaint:  Chief Complaint   Patient presents with    Follow-up     From urgent care.        History of Present Illness:    Went to  on 12/29  He was having CP while taking deep breath   Per Notes:    Pt states that chest wall is burning and he states deep breath or cough it hurts, pt states it hurts the most when he lays down, pt denies any history of heart problems     Negative for Flu and COVID  CXR lung clear   EKG NSR     He was diagnosed with pleurisy  D/C home with Naproxen      At Visit states pain has improved  He does feel some pain in mid chest  Describes it as tightness     Pt states that when the episode first occurred, it felt like something was obstructing him from breathing. Pain was worse when laying flat     History:  History reviewed. No pertinent past medical history.  Past Surgical History:   Procedure Laterality Date    TONSILLECTOMY      trigger finger injection       Family History   Problem Relation Age of Onset    Lung cancer Mother     Leukemia Father     Osteoporosis Father     Arthritis Father     Colon cancer Neg Hx     Prostate cancer Neg Hx      Social History     Socioeconomic History    Marital status: Single    Number of children: 0   Tobacco Use    Smoking status: Every Day     Current packs/day: 1.00     Types: Cigarettes    Smokeless tobacco: Current   Substance and Sexual Activity    Alcohol use: Yes     Alcohol/week: 25.0 standard drinks of alcohol     Types: 25 Cans of beer per week    Drug use: Never     Social Determinants of Health     Stress: Stress Concern Present (10/23/2019)    [place] citizen [employer] - Occupational Stress Questionnaire     Feeling of Stress : Very much     There is no problem list on file for this patient.    Review of patient's allergies " indicates:  No Known Allergies    The following were reviewed at this visit: active problem list, medication list, allergies, family history, social history, and health maintenance.    Medications:  Current Outpatient Medications on File Prior to Visit   Medication Sig Dispense Refill    acetaminophen (TYLENOL ORAL) Take by mouth.      azelastine-fluticasone (DYMISTA) 137-50 mcg/spray Spry nassal spray 2 sprays by Each Nostril route once daily. 23 g 5    hydrOXYchloroQUINE (PLAQUENIL) 200 mg tablet Take 1 tablet (200 mg total) by mouth 2 (two) times daily. 180 tablet 1    naproxen (NAPROSYN) 500 MG tablet Take 500 mg by mouth.       No current facility-administered medications on file prior to visit.       Medications have been reviewed and reconciled with patient at this visit.  Barriers to medications reviewed with patient.    Adverse reactions to current medications reviewed with patient..    Over the counter medications reviewed and reconciled with patient.    Exam:  Wt Readings from Last 3 Encounters:   01/04/24 74 kg (163 lb 2.3 oz)   09/11/23 71.2 kg (156 lb 15.5 oz)   08/21/23 77.2 kg (170 lb 3.1 oz)     Temp Readings from Last 3 Encounters:   01/04/24 98.8 °F (37.1 °C) (Tympanic)   07/11/22 98.8 °F (37.1 °C) (Temporal)   11/06/19 97.7 °F (36.5 °C) (Tympanic)     BP Readings from Last 3 Encounters:   01/04/24 136/80   09/11/23 (!) 147/96   06/05/23 117/78     Pulse Readings from Last 3 Encounters:   01/04/24 79   09/11/23 84   06/05/23 70     Body mass index is 24.09 kg/m².      Review of Systems   Respiratory:  Positive for shortness of breath.    Cardiovascular:  Positive for chest pain.     Physical Exam  Nursing note reviewed.   Cardiovascular:      Rate and Rhythm: Normal rate and regular rhythm.      Pulses: Normal pulses.      Heart sounds: Normal heart sounds.   Pulmonary:      Effort: Pulmonary effort is normal. No respiratory distress.      Breath sounds: Normal breath sounds.   Chest:           Comments: Tightness in area   Abdominal:      General: Bowel sounds are normal.   Musculoskeletal:         General: Tenderness present.   Skin:     General: Skin is dry.   Neurological:      Mental Status: He is alert and oriented to person, place, and time.   Psychiatric:         Mood and Affect: Mood normal.         Behavior: Behavior normal.         Thought Content: Thought content normal.         Judgment: Judgment normal.         Laboratory Reviewed ({Yes)  Lab Results   Component Value Date    WBC 4.79 09/05/2023    HGB 14.5 09/05/2023    HCT 43.2 09/05/2023     09/05/2023    CHOL 161 11/06/2019    TRIG 35 11/06/2019    HDL 65 11/06/2019    ALT 8 (L) 09/05/2023    AST 21 09/05/2023     09/05/2023    K 4.2 09/05/2023     09/05/2023    CREATININE 0.9 09/05/2023    BUN 8 09/05/2023    CO2 23 09/05/2023    TSH 0.721 06/02/2023       Be was seen today for follow-up.    Diagnoses and all orders for this visit:    Chest pain on breathing  -     naproxen (NAPROSYN) 500 MG tablet; Take 1 tablet (500 mg total) by mouth 2 (two) times daily with meals.  -     D-DIMER, QUANTITATIVE; Future  -     TROPONIN I; Future    Pleurisy  -     naproxen (NAPROSYN) 500 MG tablet; Take 1 tablet (500 mg total) by mouth 2 (two) times daily with meals.  -     D-DIMER, QUANTITATIVE; Future  -     TROPONIN I; Future      Patient was scheduled for labs to for Monday will move them to today and add D-dimer and troponin. if the D-dimer is elevated will order a CTA of the chest to rule out a blood clot.  If labs that I ordered today are negative and pain persist, will discuss with patient regarding ordering an ECHO.  Explained to the patient I think the pain is likely related to pleurisy or costochondritis since the pain has improved with anti-inflammatory and SOB/pain with breathing has improved     Care Plan/Goals: Reviewed    Goals    None         Follow up: No follow-ups on file.    After visit summary was printed  and given to patient upon discharge today.  Patient goals and care plan are included in After Visit Summary.

## 2024-02-23 ENCOUNTER — HOSPITAL ENCOUNTER (OUTPATIENT)
Dept: RADIOLOGY | Facility: HOSPITAL | Age: 29
Discharge: HOME | End: 2024-02-23
Attending: PHYSICIAN ASSISTANT
Payer: COMMERCIAL

## 2024-02-23 ENCOUNTER — OFFICE VISIT (OUTPATIENT)
Dept: RHEUMATOLOGY | Facility: CLINIC | Age: 29
End: 2024-02-23
Payer: COMMERCIAL

## 2024-02-23 ENCOUNTER — TELEPHONE (OUTPATIENT)
Dept: RHEUMATOLOGY | Facility: CLINIC | Age: 29
End: 2024-02-23
Payer: COMMERCIAL

## 2024-02-23 VITALS
HEIGHT: 69 IN | DIASTOLIC BLOOD PRESSURE: 82 MMHG | SYSTOLIC BLOOD PRESSURE: 138 MMHG | WEIGHT: 163.56 LBS | BODY MASS INDEX: 24.23 KG/M2 | HEART RATE: 86 BPM

## 2024-02-23 DIAGNOSIS — F40.231 SEVERE NEEDLE PHOBIA: ICD-10-CM

## 2024-02-23 DIAGNOSIS — Z79.899 LONG-TERM USE OF PLAQUENIL: ICD-10-CM

## 2024-02-23 DIAGNOSIS — Z51.81 MEDICATION MONITORING ENCOUNTER: ICD-10-CM

## 2024-02-23 DIAGNOSIS — D84.9 IMMUNOCOMPROMISED PATIENT: ICD-10-CM

## 2024-02-23 DIAGNOSIS — M05.79 RHEUMATOID ARTHRITIS INVOLVING MULTIPLE SITES WITH POSITIVE RHEUMATOID FACTOR: Primary | ICD-10-CM

## 2024-02-23 DIAGNOSIS — Z79.899 HIGH RISK MEDICATION USE: ICD-10-CM

## 2024-02-23 DIAGNOSIS — M06.9 RHEUMATOID ARTHRITIS FLARE: ICD-10-CM

## 2024-02-23 DIAGNOSIS — M25.50 POLYARTHRALGIA: ICD-10-CM

## 2024-02-23 DIAGNOSIS — R09.1 PLEURISY: ICD-10-CM

## 2024-02-23 DIAGNOSIS — R53.82 CHRONIC FATIGUE: ICD-10-CM

## 2024-02-23 DIAGNOSIS — M05.79 RHEUMATOID ARTHRITIS INVOLVING MULTIPLE SITES WITH POSITIVE RHEUMATOID FACTOR: ICD-10-CM

## 2024-02-23 DIAGNOSIS — Z72.0 TOBACCO USE: ICD-10-CM

## 2024-02-23 PROCEDURE — 3075F SYST BP GE 130 - 139MM HG: CPT | Mod: CPTII,S$GLB,, | Performed by: PHYSICIAN ASSISTANT

## 2024-02-23 PROCEDURE — 1159F MED LIST DOCD IN RCRD: CPT | Mod: CPTII,S$GLB,, | Performed by: PHYSICIAN ASSISTANT

## 2024-02-23 PROCEDURE — 71046 X-RAY EXAM CHEST 2 VIEWS: CPT | Mod: 26,,, | Performed by: RADIOLOGY

## 2024-02-23 PROCEDURE — 99215 OFFICE O/P EST HI 40 MIN: CPT | Mod: S$GLB,,, | Performed by: PHYSICIAN ASSISTANT

## 2024-02-23 PROCEDURE — 3008F BODY MASS INDEX DOCD: CPT | Mod: CPTII,S$GLB,, | Performed by: PHYSICIAN ASSISTANT

## 2024-02-23 PROCEDURE — 99999 PR PBB SHADOW E&M-EST. PATIENT-LVL III: CPT | Mod: PBBFAC,,, | Performed by: PHYSICIAN ASSISTANT

## 2024-02-23 PROCEDURE — 1160F RVW MEDS BY RX/DR IN RCRD: CPT | Mod: CPTII,S$GLB,, | Performed by: PHYSICIAN ASSISTANT

## 2024-02-23 PROCEDURE — 71046 X-RAY EXAM CHEST 2 VIEWS: CPT | Mod: TC

## 2024-02-23 PROCEDURE — 3079F DIAST BP 80-89 MM HG: CPT | Mod: CPTII,S$GLB,, | Performed by: PHYSICIAN ASSISTANT

## 2024-02-23 RX ORDER — SULFASALAZINE 500 MG/1
500 TABLET, DELAYED RELEASE ORAL 2 TIMES DAILY
Qty: 60 TABLET | Refills: 1 | Status: SHIPPED | OUTPATIENT
Start: 2024-02-23 | End: 2024-04-12 | Stop reason: SDUPTHER

## 2024-02-23 RX ORDER — PREDNISONE 5 MG/1
5 TABLET ORAL DAILY
Qty: 30 TABLET | Refills: 1 | Status: SHIPPED | OUTPATIENT
Start: 2024-02-23 | End: 2024-05-23 | Stop reason: SDUPTHER

## 2024-02-23 ASSESSMENT — ROUTINE ASSESSMENT OF PATIENT INDEX DATA (RAPID3): MDHAQ FUNCTION SCORE: 0.1

## 2024-02-23 NOTE — PROGRESS NOTES
Subjective:      Patient ID: Be Brito is a 28 y.o. male.    Chief Complaint: Disease Management and Rheumatoid Arthritis    HPI   Be Brito  is a 28 y.o. male seen today for follow-up seropositive RA on Plaquenil 200 mg b.i.d..  Failed methotrexate due to severe incapacitating fatigue.  Over the last 2 weeks feels like he is been flaring more.  Particularly the right shoulder is more bothersome.  A.m. stiffness is lasting approximately an hour.  Still complaining of fatigue although it is improved off of methotrexate.    He was previously followed by my colleagues.  This is my 1st appointment with him.  In the past Vidhya had recommended potentially adding sulfasalazine if symptoms were poorly controlled.  He is a smoker.    Recently seen in urgent care and followed up with primary care about 2 months ago.  He was having chest pain.  They diagnosed him with pleurisy.  Has not had any imaging.  Has not had any further symptoms in the last 6 weeks.    Patient denies fevers, chills, photosensitivity, eye pain, shortness of breath, chest pain, hematuria, blood in the stool, rash, sicca symptoms, raynauds, finger ulcerations.  Rheumatologic systems otherwise negative.    Serologies/Labs:  +, +CCP 98.8  Neg TARIQ  Current Treatment:   mg bid  Previous Treatment:   MTX - severe fatigue    Current Outpatient Medications:     acetaminophen (TYLENOL ORAL), Take by mouth., Disp: , Rfl:     azelastine-fluticasone (DYMISTA) 137-50 mcg/spray Spry nassal spray, 2 sprays by Each Nostril route once daily., Disp: 23 g, Rfl: 5    hydrOXYchloroQUINE (PLAQUENIL) 200 mg tablet, Take 1 tablet (200 mg total) by mouth 2 (two) times daily., Disp: 180 tablet, Rfl: 1    naproxen (NAPROSYN) 500 MG tablet, Take 1 tablet (500 mg total) by mouth 2 (two) times daily with meals., Disp: 60 tablet, Rfl: 1    natrexone tablet 4.5 mg, Take 1 tablet (4.5 mg total) by mouth every evening., Disp: 30 tablet, Rfl: 1    predniSONE  "(DELTASONE) 5 MG tablet, Take 1 tablet (5 mg total) by mouth once daily., Disp: 30 tablet, Rfl: 1    sulfaSALAzine (AZULFIDINE) 500 MG EC tablet, Take 1 tablet (500 mg total) by mouth 2 (two) times a day., Disp: 60 tablet, Rfl: 1    History reviewed. No pertinent past medical history.  Family History   Problem Relation Age of Onset    Lung cancer Mother     Leukemia Father     Osteoporosis Father     Arthritis Father     Colon cancer Neg Hx     Prostate cancer Neg Hx      Social History     Socioeconomic History    Marital status: Single    Number of children: 0   Tobacco Use    Smoking status: Every Day     Current packs/day: 1.00     Types: Cigarettes    Smokeless tobacco: Current   Substance and Sexual Activity    Alcohol use: Yes     Alcohol/week: 25.0 standard drinks of alcohol     Types: 25 Cans of beer per week    Drug use: Never     Social Determinants of Health     Stress: Stress Concern Present (10/23/2019)    [facility] of Occupational Health - Occupational Stress Questionnaire     Feeling of Stress : Very much     Review of patient's allergies indicates:  No Known Allergies    Objective:   /82   Pulse 86   Ht 5' 9" (1.753 m)   Wt 74.2 kg (163 lb 9.3 oz)   BMI 24.16 kg/m²     There is no immunization history on file for this patient.    Physical Exam   Constitutional: He is oriented to person, place, and time. No distress.   HENT:   Head: Normocephalic and atraumatic.   Pulmonary/Chest: Effort normal.   Musculoskeletal:         General: Tenderness present. No swelling or deformity. Normal range of motion.   Neurological: He is alert and oriented to person, place, and time.   Skin: No rash noted.   Psychiatric: His behavior is normal. Mood normal.   Nursing note and vitals reviewed.    Mild synovitis right shoulder  no dactylitis, no enthesitis  No effusions of large or small joints  100% fist formation  Well preserved ROM    No results found for this or any previous visit (from the past " 672 hour(s)).  Recent Results (from the past 2016 hour(s))   CBC Auto Differential    Collection Time: 01/04/24 11:03 AM   Result Value Ref Range    WBC 4.66 3.90 - 12.70 K/uL    RBC 4.46 (L) 4.60 - 6.20 M/uL    Hemoglobin 14.0 14.0 - 18.0 g/dL    Hematocrit 41.3 40.0 - 54.0 %    MCV 93 82 - 98 fL    MCH 31.4 (H) 27.0 - 31.0 pg    MCHC 33.9 32.0 - 36.0 g/dL    RDW 11.7 11.5 - 14.5 %    Platelets 275 150 - 450 K/uL    MPV 9.6 9.2 - 12.9 fL    Immature Granulocytes 0.4 0.0 - 0.5 %    Gran # (ANC) 2.9 1.8 - 7.7 K/uL    Immature Grans (Abs) 0.02 0.00 - 0.04 K/uL    Lymph # 1.4 1.0 - 4.8 K/uL    Mono # 0.4 0.3 - 1.0 K/uL    Eos # 0.0 0.0 - 0.5 K/uL    Baso # 0.06 0.00 - 0.20 K/uL    nRBC 0 0 /100 WBC    Gran % 61.2 38.0 - 73.0 %    Lymph % 29.0 18.0 - 48.0 %    Mono % 7.5 4.0 - 15.0 %    Eosinophil % 0.6 0.0 - 8.0 %    Basophil % 1.3 0.0 - 1.9 %    Differential Method Automated    Comprehensive Metabolic Panel    Collection Time: 01/04/24 11:03 AM   Result Value Ref Range    Sodium 141 136 - 145 mmol/L    Potassium 4.4 3.5 - 5.1 mmol/L    Chloride 105 95 - 110 mmol/L    CO2 25 23 - 29 mmol/L    Glucose 92 70 - 110 mg/dL    BUN 10 6 - 20 mg/dL    Creatinine 0.9 0.5 - 1.4 mg/dL    Calcium 9.4 8.7 - 10.5 mg/dL    Total Protein 7.2 6.0 - 8.4 g/dL    Albumin 4.0 3.5 - 5.2 g/dL    Total Bilirubin 0.7 0.1 - 1.0 mg/dL    Alkaline Phosphatase 58 55 - 135 U/L    AST 21 10 - 40 U/L    ALT 13 10 - 44 U/L    eGFR >60 >60 mL/min/1.73 m^2    Anion Gap 11 8 - 16 mmol/L   C-Reactive Protein    Collection Time: 01/04/24 11:03 AM   Result Value Ref Range    CRP 8.1 0.0 - 8.2 mg/L   Sedimentation rate    Collection Time: 01/04/24 11:03 AM   Result Value Ref Range    Sed Rate 15 (H) 0 - 10 mm/Hr   D-DIMER, QUANTITATIVE    Collection Time: 01/04/24 11:03 AM   Result Value Ref Range    D-Dimer 0.22 <0.50 mg/L FEU   TROPONIN I    Collection Time: 01/04/24 11:03 AM   Result Value Ref Range    Troponin I <0.006 0.000 - 0.026 ng/mL         Lab  Results   Component Value Date    TBGOLDPLUS Negative 07/21/2022      Lab Results   Component Value Date    HEPBCAB Negative 07/21/2022    HEPCAB Negative 07/21/2022        Imaging  I have personally reviewed images and reports as below.  I agree with the interpretation.  X-Ray Chest PA And Lateral  Narrative: EXAM: XR CHEST PA AND LATERAL    CLINICAL HISTORY:  Rheumatoid arthritis.    TECHNIQUE: 2 view chest x-ray.    FINDINGS: The heart size is normal. The lung fields are clear.  Impression:  No acute findings.    Finalized on: 2/23/2024 11:56 AM By:  Waldo Jarrell MD  [facility]# [id number]      2024-02-23 11:58:48.565    [facility]        Assessment:     1. Rheumatoid arthritis involving multiple sites with positive rheumatoid factor    2. Long-term use of Plaquenil    3. Medication monitoring encounter    4. High risk medication use    5. Immunocompromised patient    6. Rheumatoid arthritis flare    7. Pleurisy    8. Chronic fatigue    9. Tobacco use    10. Severe needle phobia        Plan:     Be was seen today for disease management and rheumatoid arthritis.    Diagnoses and all orders for this visit:    Rheumatoid arthritis involving multiple sites with positive rheumatoid factor  -     sulfaSALAzine (AZULFIDINE) 500 MG EC tablet; Take 1 tablet (500 mg total) by mouth 2 (two) times a day.  -     predniSONE (DELTASONE) 5 MG tablet; Take 1 tablet (5 mg total) by mouth once daily.  -     X-Ray Chest PA And Lateral; Future    Long-term use of Plaquenil    Medication monitoring encounter    High risk medication use    Immunocompromised patient    Rheumatoid arthritis flare  -     predniSONE (DELTASONE) 5 MG tablet; Take 1 tablet (5 mg total) by mouth once daily.    Pleurisy  -     X-Ray Chest PA And Lateral; Future    Chronic fatigue  -     natrexone tablet 4.5 mg; Take 1 tablet (4.5 mg total) by mouth every evening.    Tobacco use    Severe needle phobia    Seropositive RA diagnosed in 2022  Failed methotrexate due to  severe incapacitating fatigue  C/w Plaq 200 mg bid  Pt has deferred biologic for now  Has a phobia of needles  Could consider rinvoq  May have difficulty w approval without failing tnf  Pt smoker - would strongly encourage cessation  Add  mg bid  Cbc/cmp in 2 weeks and 4 weeks later  Follow up w me after  Can increase to 1000 mg bid if tolerating it well and labs stable  Discussed risks and benefits  Literature provided to the patient  Add prednisone 5 mg daily p.r.n. RA flares  Chronic fatigue  Likely multifactorial  Add low-dose naltrexone 4.5 mg q.h.s.  Discussed risks and benefits  Prescription E scribed to [facility]  Advise patient to delay starting this until he has been on sulfasalazine about 10 days  Episode of pleurisy recently  Baseline CXR today reviewed as above and wnl  Low threshold to order chest CT if he has any recurring symptoms  Drug therapy requiring intensive monitoring for toxicity  High Risk Medication Monitoring encounter  No current medication related issues, no evidence of toxicity  I ordered labs for toxicity monitoring, have personally reviewed the findings, and discussed them with the patient.  Pending labs will be sent via the portal  Compromised immune system secondary to autoimmune disease and/or use of immunosuppressive drugs.  Monitor carefully for infections.  Advised patient to get immediate medical care if any infection arises.  Also advised strict adherence age-appropriate vaccinations and cancer screenings with PCP.  Patient advised to hold DMARD and/or biologic therapy for signs of infection or for surgery. If you are unsure what to do please call our office for instruction.[facility] [phone]  Return to clinic: 6 wks labs prior - VV ok    45 minutes of total time spent on the encounter, which includes face to face time and non-face to face time preparing to see the patient (eg, review of tests), Obtaining and/or reviewing separately obtained  history, Documenting clinical information in the electronic or other health record, Independently interpreting results (not separately reported) and communicating results to the patient/family/caregiver, or Care coordination (not separately reported).     Follow up in about 8 weeks (around 4/19/2024).    The patient understands, chooses and consents to this plan and accepts all the risks which include but are not limited to the risks mentioned above.     Disclaimer: This note was prepared using a voice recognition system and is likely to have sound alike errors within the text.

## 2024-03-08 ENCOUNTER — LAB VISIT (OUTPATIENT)
Dept: LAB | Facility: HOSPITAL | Age: 29
End: 2024-03-08
Attending: PHYSICIAN ASSISTANT
Payer: COMMERCIAL

## 2024-03-08 DIAGNOSIS — D84.9 IMMUNOCOMPROMISED PATIENT: ICD-10-CM

## 2024-03-08 DIAGNOSIS — M05.79 RHEUMATOID ARTHRITIS INVOLVING MULTIPLE SITES WITH POSITIVE RHEUMATOID FACTOR: ICD-10-CM

## 2024-03-08 DIAGNOSIS — Z79.899 LONG-TERM USE OF PLAQUENIL: ICD-10-CM

## 2024-03-08 DIAGNOSIS — Z51.81 MEDICATION MONITORING ENCOUNTER: ICD-10-CM

## 2024-03-08 DIAGNOSIS — M06.9 RHEUMATOID ARTHRITIS FLARE: ICD-10-CM

## 2024-03-08 DIAGNOSIS — Z79.899 HIGH RISK MEDICATION USE: ICD-10-CM

## 2024-03-08 DIAGNOSIS — M25.50 POLYARTHRALGIA: ICD-10-CM

## 2024-03-08 LAB
ALBUMIN SERPL BCP-MCNC: 4 G/DL (ref 3.5–5.2)
ALP SERPL-CCNC: 56 U/L (ref 55–135)
ALT SERPL W/O P-5'-P-CCNC: 12 U/L (ref 10–44)
ANION GAP SERPL CALC-SCNC: 7 MMOL/L (ref 8–16)
AST SERPL-CCNC: 23 U/L (ref 10–40)
BASOPHILS # BLD AUTO: 0.05 K/UL (ref 0–0.2)
BASOPHILS NFR BLD: 0.8 % (ref 0–1.9)
BILIRUB SERPL-MCNC: 0.9 MG/DL (ref 0.1–1)
BUN SERPL-MCNC: 15 MG/DL (ref 6–20)
CALCIUM SERPL-MCNC: 9.2 MG/DL (ref 8.7–10.5)
CHLORIDE SERPL-SCNC: 103 MMOL/L (ref 95–110)
CO2 SERPL-SCNC: 25 MMOL/L (ref 23–29)
CREAT SERPL-MCNC: 1 MG/DL (ref 0.5–1.4)
DIFFERENTIAL METHOD BLD: ABNORMAL
EOSINOPHIL # BLD AUTO: 0 K/UL (ref 0–0.5)
EOSINOPHIL NFR BLD: 0 % (ref 0–8)
ERYTHROCYTE [DISTWIDTH] IN BLOOD BY AUTOMATED COUNT: 12 % (ref 11.5–14.5)
EST. GFR  (NO RACE VARIABLE): >60 ML/MIN/1.73 M^2
GLUCOSE SERPL-MCNC: 81 MG/DL (ref 70–110)
HCT VFR BLD AUTO: 39.9 % (ref 40–54)
HGB BLD-MCNC: 13.6 G/DL (ref 14–18)
IMM GRANULOCYTES # BLD AUTO: 0.02 K/UL (ref 0–0.04)
IMM GRANULOCYTES NFR BLD AUTO: 0.3 % (ref 0–0.5)
LYMPHOCYTES # BLD AUTO: 1.8 K/UL (ref 1–4.8)
LYMPHOCYTES NFR BLD: 30.3 % (ref 18–48)
MCH RBC QN AUTO: 31.2 PG (ref 27–31)
MCHC RBC AUTO-ENTMCNC: 34.1 G/DL (ref 32–36)
MCV RBC AUTO: 92 FL (ref 82–98)
MONOCYTES # BLD AUTO: 0.5 K/UL (ref 0.3–1)
MONOCYTES NFR BLD: 8.1 % (ref 4–15)
NEUTROPHILS # BLD AUTO: 3.7 K/UL (ref 1.8–7.7)
NEUTROPHILS NFR BLD: 60.5 % (ref 38–73)
NRBC BLD-RTO: 0 /100 WBC
PLATELET # BLD AUTO: 267 K/UL (ref 150–450)
PMV BLD AUTO: 9.9 FL (ref 9.2–12.9)
POTASSIUM SERPL-SCNC: 3.8 MMOL/L (ref 3.5–5.1)
PROT SERPL-MCNC: 7.4 G/DL (ref 6–8.4)
RBC # BLD AUTO: 4.36 M/UL (ref 4.6–6.2)
SODIUM SERPL-SCNC: 135 MMOL/L (ref 136–145)
WBC # BLD AUTO: 6.07 K/UL (ref 3.9–12.7)

## 2024-03-08 PROCEDURE — 85025 COMPLETE CBC W/AUTO DIFF WBC: CPT | Performed by: PHYSICIAN ASSISTANT

## 2024-03-08 PROCEDURE — 80053 COMPREHEN METABOLIC PANEL: CPT | Performed by: PHYSICIAN ASSISTANT

## 2024-03-08 PROCEDURE — 36415 COLL VENOUS BLD VENIPUNCTURE: CPT | Mod: PN | Performed by: PHYSICIAN ASSISTANT

## 2024-03-25 ENCOUNTER — PATIENT MESSAGE (OUTPATIENT)
Dept: RHEUMATOLOGY | Facility: CLINIC | Age: 29
End: 2024-03-25
Payer: COMMERCIAL

## 2024-03-25 DIAGNOSIS — Z51.81 MEDICATION MONITORING ENCOUNTER: ICD-10-CM

## 2024-03-25 DIAGNOSIS — R53.82 CHRONIC FATIGUE: ICD-10-CM

## 2024-03-25 DIAGNOSIS — M06.9 RHEUMATOID ARTHRITIS FLARE: ICD-10-CM

## 2024-03-25 DIAGNOSIS — M05.79 RHEUMATOID ARTHRITIS INVOLVING MULTIPLE SITES WITH POSITIVE RHEUMATOID FACTOR: Primary | ICD-10-CM

## 2024-03-25 DIAGNOSIS — F40.231 SEVERE NEEDLE PHOBIA: ICD-10-CM

## 2024-03-26 ENCOUNTER — TELEPHONE (OUTPATIENT)
Dept: RHEUMATOLOGY | Facility: CLINIC | Age: 29
End: 2024-03-26
Payer: COMMERCIAL

## 2024-03-26 DIAGNOSIS — M06.00 SERONEGATIVE RHEUMATOID ARTHRITIS: Primary | ICD-10-CM

## 2024-03-27 NOTE — TELEPHONE ENCOUNTER
Patient denies any history of lupus. Patient amenable to PrismRA testing. Will plan to collect at future lab appt at either [facility] or formerly [facility].

## 2024-04-08 ENCOUNTER — LAB VISIT (OUTPATIENT)
Dept: LAB | Facility: HOSPITAL | Age: 29
End: 2024-04-08
Attending: PHYSICIAN ASSISTANT
Payer: COMMERCIAL

## 2024-04-08 DIAGNOSIS — M06.9 RHEUMATOID ARTHRITIS FLARE: ICD-10-CM

## 2024-04-08 DIAGNOSIS — Z79.899 HIGH RISK MEDICATION USE: Primary | ICD-10-CM

## 2024-04-08 DIAGNOSIS — Z79.899 LONG-TERM USE OF PLAQUENIL: ICD-10-CM

## 2024-04-08 DIAGNOSIS — M06.00 SERONEGATIVE RHEUMATOID ARTHRITIS: ICD-10-CM

## 2024-04-08 DIAGNOSIS — M05.79 RHEUMATOID ARTHRITIS INVOLVING MULTIPLE SITES WITH POSITIVE RHEUMATOID FACTOR: ICD-10-CM

## 2024-04-08 DIAGNOSIS — M25.50 POLYARTHRALGIA: ICD-10-CM

## 2024-04-08 DIAGNOSIS — Z79.899 HIGH RISK MEDICATION USE: ICD-10-CM

## 2024-04-08 DIAGNOSIS — D84.9 IMMUNOCOMPROMISED PATIENT: ICD-10-CM

## 2024-04-08 DIAGNOSIS — Z51.81 MEDICATION MONITORING ENCOUNTER: ICD-10-CM

## 2024-04-08 LAB
ALBUMIN SERPL BCP-MCNC: 4.1 G/DL (ref 3.5–5.2)
ALP SERPL-CCNC: 47 U/L (ref 55–135)
ALT SERPL W/O P-5'-P-CCNC: 11 U/L (ref 10–44)
ANION GAP SERPL CALC-SCNC: 9 MMOL/L (ref 8–16)
AST SERPL-CCNC: 21 U/L (ref 10–40)
BASOPHILS # BLD AUTO: 0.08 K/UL (ref 0–0.2)
BASOPHILS NFR BLD: 1.1 % (ref 0–1.9)
BILIRUB SERPL-MCNC: 1.1 MG/DL (ref 0.1–1)
BUN SERPL-MCNC: 10 MG/DL (ref 6–20)
CALCIUM SERPL-MCNC: 9.4 MG/DL (ref 8.7–10.5)
CHLORIDE SERPL-SCNC: 105 MMOL/L (ref 95–110)
CO2 SERPL-SCNC: 26 MMOL/L (ref 23–29)
CREAT SERPL-MCNC: 0.9 MG/DL (ref 0.5–1.4)
DIFFERENTIAL METHOD BLD: ABNORMAL
EOSINOPHIL # BLD AUTO: 0 K/UL (ref 0–0.5)
EOSINOPHIL NFR BLD: 0 % (ref 0–8)
ERYTHROCYTE [DISTWIDTH] IN BLOOD BY AUTOMATED COUNT: 12.8 % (ref 11.5–14.5)
EST. GFR  (NO RACE VARIABLE): >60 ML/MIN/1.73 M^2
GLUCOSE SERPL-MCNC: 83 MG/DL (ref 70–110)
HCT VFR BLD AUTO: 42.4 % (ref 40–54)
HGB BLD-MCNC: 14.3 G/DL (ref 14–18)
IMM GRANULOCYTES # BLD AUTO: 0.03 K/UL (ref 0–0.04)
IMM GRANULOCYTES NFR BLD AUTO: 0.4 % (ref 0–0.5)
LYMPHOCYTES # BLD AUTO: 2.1 K/UL (ref 1–4.8)
LYMPHOCYTES NFR BLD: 30.4 % (ref 18–48)
MCH RBC QN AUTO: 31.1 PG (ref 27–31)
MCHC RBC AUTO-ENTMCNC: 33.7 G/DL (ref 32–36)
MCV RBC AUTO: 92 FL (ref 82–98)
MONOCYTES # BLD AUTO: 0.5 K/UL (ref 0.3–1)
MONOCYTES NFR BLD: 7 % (ref 4–15)
NEUTROPHILS # BLD AUTO: 4.3 K/UL (ref 1.8–7.7)
NEUTROPHILS NFR BLD: 61.1 % (ref 38–73)
NRBC BLD-RTO: 0 /100 WBC
PLATELET # BLD AUTO: 295 K/UL (ref 150–450)
PMV BLD AUTO: 9.5 FL (ref 9.2–12.9)
POTASSIUM SERPL-SCNC: 4.1 MMOL/L (ref 3.5–5.1)
PROT SERPL-MCNC: 6.9 G/DL (ref 6–8.4)
RBC # BLD AUTO: 4.6 M/UL (ref 4.6–6.2)
SODIUM SERPL-SCNC: 140 MMOL/L (ref 136–145)
WBC # BLD AUTO: 7.05 K/UL (ref 3.9–12.7)

## 2024-04-08 PROCEDURE — 80053 COMPREHEN METABOLIC PANEL: CPT | Performed by: PHYSICIAN ASSISTANT

## 2024-04-08 PROCEDURE — 36415 COLL VENOUS BLD VENIPUNCTURE: CPT | Performed by: PHYSICIAN ASSISTANT

## 2024-04-08 PROCEDURE — 85025 COMPLETE CBC W/AUTO DIFF WBC: CPT | Performed by: PHYSICIAN ASSISTANT

## 2024-04-12 ENCOUNTER — OFFICE VISIT (OUTPATIENT)
Dept: RHEUMATOLOGY | Facility: CLINIC | Age: 29
End: 2024-04-12
Payer: COMMERCIAL

## 2024-04-12 VITALS — BODY MASS INDEX: 24.23 KG/M2 | WEIGHT: 163.56 LBS | HEIGHT: 69 IN

## 2024-04-12 DIAGNOSIS — Z79.899 IMMUNOCOMPROMISED STATE DUE TO DRUG THERAPY: ICD-10-CM

## 2024-04-12 DIAGNOSIS — D84.821 IMMUNOCOMPROMISED STATE DUE TO DRUG THERAPY: ICD-10-CM

## 2024-04-12 DIAGNOSIS — M05.79 RHEUMATOID ARTHRITIS INVOLVING MULTIPLE SITES WITH POSITIVE RHEUMATOID FACTOR: Primary | ICD-10-CM

## 2024-04-12 DIAGNOSIS — Z51.81 MEDICATION MONITORING ENCOUNTER: ICD-10-CM

## 2024-04-12 DIAGNOSIS — Z79.899 HIGH RISK MEDICATION USE: ICD-10-CM

## 2024-04-12 DIAGNOSIS — Z79.899 LONG-TERM USE OF PLAQUENIL: ICD-10-CM

## 2024-04-12 PROCEDURE — 1160F RVW MEDS BY RX/DR IN RCRD: CPT | Mod: CPTII,95,, | Performed by: PHYSICIAN ASSISTANT

## 2024-04-12 PROCEDURE — 3008F BODY MASS INDEX DOCD: CPT | Mod: CPTII,95,, | Performed by: PHYSICIAN ASSISTANT

## 2024-04-12 PROCEDURE — 1159F MED LIST DOCD IN RCRD: CPT | Mod: CPTII,95,, | Performed by: PHYSICIAN ASSISTANT

## 2024-04-12 PROCEDURE — 99214 OFFICE O/P EST MOD 30 MIN: CPT | Mod: 95,,, | Performed by: PHYSICIAN ASSISTANT

## 2024-04-12 RX ORDER — SULFASALAZINE 500 MG/1
500 TABLET, DELAYED RELEASE ORAL 2 TIMES DAILY
Qty: 180 TABLET | Refills: 1 | Status: SHIPPED | OUTPATIENT
Start: 2024-04-12

## 2024-04-12 RX ORDER — HYDROXYCHLOROQUINE SULFATE 200 MG/1
200 TABLET, FILM COATED ORAL 2 TIMES DAILY
Qty: 180 TABLET | Refills: 1 | Status: SHIPPED | OUTPATIENT
Start: 2024-04-12 | End: 2024-05-23

## 2024-04-12 NOTE — PROGRESS NOTES
Subjective:      Patient ID: Be Brito is a 29 y.o. male.    Chief Complaint: Rheumatoid Arthritis    HPI   Be Brito  is a 29 y.o. male seen today for follow-up seropositive RA on  mg b.i.d in addition to  mg bid.  Tolerating both well, though he initially felt worse w initiation of SSZ.  Says one day sxs just improved drastically.  Failed MTX due to severe incapacitating fatigue.  Happy w his progress since las visit.  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  Prism RA is pending.     Failed LDN for fatigue.  Says it has made him more irritable as well.    Patient denies fevers, chills, photosensitivity, eye pain, shortness of breath, chest pain, hematuria, blood in the stool, rash, sicca symptoms, raynauds, finger ulcerations.  Rheumatologic systems otherwise negative.    Serologies/Labs:  +, +CCP 98.8  Neg TARIQ  Current Treatment:   mg bid   mg bid  Previous Treatment:   MTX - severe fatigue    Current Outpatient Medications:     acetaminophen (TYLENOL ORAL), Take by mouth., Disp: , Rfl:     azelastine-fluticasone (DYMISTA) 137-50 mcg/spray Spry nassal spray, 2 sprays by Each Nostril route once daily., Disp: 23 g, Rfl: 5    naproxen (NAPROSYN) 500 MG tablet, Take 1 tablet (500 mg total) by mouth 2 (two) times daily with meals., Disp: 60 tablet, Rfl: 1    predniSONE (DELTASONE) 5 MG tablet, Take 1 tablet (5 mg total) by mouth once daily., Disp: 30 tablet, Rfl: 1    hydroxychloroquine (PLAQUENIL) 200 mg tablet, Take 1 tablet (200 mg total) by mouth 2 (two) times daily., Disp: 180 tablet, Rfl: 1    sulfaSALAzine (AZULFIDINE) 500 MG EC tablet, Take 1 tablet (500 mg total) by mouth 2 (two) times a day., Disp: 180 tablet, Rfl: 1    History reviewed. No pertinent past medical history.  Family History   Problem Relation Age of Onset    Lung cancer Mother     Leukemia Father     Osteoporosis Father     Arthritis Father     Colon cancer Neg Hx     Prostate  "cancer Neg Hx      Social History     Socioeconomic History    Marital status: Single    Number of children: 0   Tobacco Use    Smoking status: Every Day     Current packs/day: 1.00     Types: Cigarettes    Smokeless tobacco: Current   Substance and Sexual Activity    Alcohol use: Yes     Alcohol/week: 25.0 standard drinks of alcohol     Types: 25 Cans of beer per week    Drug use: Never     Social Determinants of Health     Stress: Stress Concern Present (10/23/2019)    [facility] of Occupational Health - Occupational Stress Questionnaire     Feeling of Stress : Very much     Review of patient's allergies indicates:  No Known Allergies    Objective:   Ht 5' 9" (1.753 m)   Wt 74.2 kg (163 lb 9.3 oz)   BMI 24.16 kg/m²     There is no immunization history on file for this patient.    Physical Exam   Constitutional: He is oriented to person, place, and time. No distress.   HENT:   Head: Normocephalic and atraumatic.   Pulmonary/Chest: Effort normal.   Musculoskeletal:      Cervical back: Normal range of motion.   Neurological: He is alert and oriented to person, place, and time.   Psychiatric: His behavior is normal. Mood normal.       Recent Results (from the past 672 hour(s))   CBC Auto Differential    Collection Time: 04/08/24 10:42 AM   Result Value Ref Range    WBC 7.05 3.90 - 12.70 K/uL    RBC 4.60 4.60 - 6.20 M/uL    Hemoglobin 14.3 14.0 - 18.0 g/dL    Hematocrit 42.4 40.0 - 54.0 %    MCV 92 82 - 98 fL    MCH 31.1 (H) 27.0 - 31.0 pg    MCHC 33.7 32.0 - 36.0 g/dL    RDW 12.8 11.5 - 14.5 %    Platelets 295 150 - 450 K/uL    MPV 9.5 9.2 - 12.9 fL    Immature Granulocytes 0.4 0.0 - 0.5 %    Gran # (ANC) 4.3 1.8 - 7.7 K/uL    Immature Grans (Abs) 0.03 0.00 - 0.04 K/uL    Lymph # 2.1 1.0 - 4.8 K/uL    Mono # 0.5 0.3 - 1.0 K/uL    Eos # 0.0 0.0 - 0.5 K/uL    Baso # 0.08 0.00 - 0.20 K/uL    nRBC 0 0 /100 WBC    Gran % 61.1 38.0 - 73.0 %    Lymph % 30.4 18.0 - 48.0 %    Mono % 7.0 4.0 - 15.0 %    Eosinophil % " 0.0 0.0 - 8.0 %    Basophil % 1.1 0.0 - 1.9 %    Differential Method Automated    Comprehensive Metabolic Panel    Collection Time: 04/08/24 11:42 AM   Result Value Ref Range    Sodium 140 136 - 145 mmol/L    Potassium 4.1 3.5 - 5.1 mmol/L    Chloride 105 95 - 110 mmol/L    CO2 26 23 - 29 mmol/L    Glucose 83 70 - 110 mg/dL    BUN 10 6 - 20 mg/dL    Creatinine 0.9 0.5 - 1.4 mg/dL    Calcium 9.4 8.7 - 10.5 mg/dL    Total Protein 6.9 6.0 - 8.4 g/dL    Albumin 4.1 3.5 - 5.2 g/dL    Total Bilirubin 1.1 (H) 0.1 - 1.0 mg/dL    Alkaline Phosphatase 47 (L) 55 - 135 U/L    AST 21 10 - 40 U/L    ALT 11 10 - 44 U/L    eGFR >60 >60 mL/min/1.73 m^2    Anion Gap 9 8 - 16 mmol/L   Misc Sendout Test, Blood Prism RA    Collection Time: 04/08/24 11:42 AM   Result Value Ref Range    Miscellaneous Test Name Prism RA      Recent Results (from the past 2016 hour(s))   CBC Auto Differential    Collection Time: 03/08/24  4:42 PM   Result Value Ref Range    WBC 6.07 3.90 - 12.70 K/uL    RBC 4.36 (L) 4.60 - 6.20 M/uL    Hemoglobin 13.6 (L) 14.0 - 18.0 g/dL    Hematocrit 39.9 (L) 40.0 - 54.0 %    MCV 92 82 - 98 fL    MCH 31.2 (H) 27.0 - 31.0 pg    MCHC 34.1 32.0 - 36.0 g/dL    RDW 12.0 11.5 - 14.5 %    Platelets 267 150 - 450 K/uL    MPV 9.9 9.2 - 12.9 fL    Immature Granulocytes 0.3 0.0 - 0.5 %    Gran # (ANC) 3.7 1.8 - 7.7 K/uL    Immature Grans (Abs) 0.02 0.00 - 0.04 K/uL    Lymph # 1.8 1.0 - 4.8 K/uL    Mono # 0.5 0.3 - 1.0 K/uL    Eos # 0.0 0.0 - 0.5 K/uL    Baso # 0.05 0.00 - 0.20 K/uL    nRBC 0 0 /100 WBC    Gran % 60.5 38.0 - 73.0 %    Lymph % 30.3 18.0 - 48.0 %    Mono % 8.1 4.0 - 15.0 %    Eosinophil % 0.0 0.0 - 8.0 %    Basophil % 0.8 0.0 - 1.9 %    Differential Method Automated    Comprehensive Metabolic Panel    Collection Time: 03/08/24  4:42 PM   Result Value Ref Range    Sodium 135 (L) 136 - 145 mmol/L    Potassium 3.8 3.5 - 5.1 mmol/L    Chloride 103 95 - 110 mmol/L    CO2 25 23 - 29 mmol/L    Glucose 81 70 - 110 mg/dL    BUN  15 6 - 20 mg/dL    Creatinine 1.0 0.5 - 1.4 mg/dL    Calcium 9.2 8.7 - 10.5 mg/dL    Total Protein 7.4 6.0 - 8.4 g/dL    Albumin 4.0 3.5 - 5.2 g/dL    Total Bilirubin 0.9 0.1 - 1.0 mg/dL    Alkaline Phosphatase 56 55 - 135 U/L    AST 23 10 - 40 U/L    ALT 12 10 - 44 U/L    eGFR >60 >60 mL/min/1.73 m^2    Anion Gap 7 (L) 8 - 16 mmol/L   CBC Auto Differential    Collection Time: 04/08/24 10:42 AM   Result Value Ref Range    WBC 7.05 3.90 - 12.70 K/uL    RBC 4.60 4.60 - 6.20 M/uL    Hemoglobin 14.3 14.0 - 18.0 g/dL    Hematocrit 42.4 40.0 - 54.0 %    MCV 92 82 - 98 fL    MCH 31.1 (H) 27.0 - 31.0 pg    MCHC 33.7 32.0 - 36.0 g/dL    RDW 12.8 11.5 - 14.5 %    Platelets 295 150 - 450 K/uL    MPV 9.5 9.2 - 12.9 fL    Immature Granulocytes 0.4 0.0 - 0.5 %    Gran # (ANC) 4.3 1.8 - 7.7 K/uL    Immature Grans (Abs) 0.03 0.00 - 0.04 K/uL    Lymph # 2.1 1.0 - 4.8 K/uL    Mono # 0.5 0.3 - 1.0 K/uL    Eos # 0.0 0.0 - 0.5 K/uL    Baso # 0.08 0.00 - 0.20 K/uL    nRBC 0 0 /100 WBC    Gran % 61.1 38.0 - 73.0 %    Lymph % 30.4 18.0 - 48.0 %    Mono % 7.0 4.0 - 15.0 %    Eosinophil % 0.0 0.0 - 8.0 %    Basophil % 1.1 0.0 - 1.9 %    Differential Method Automated    Comprehensive Metabolic Panel    Collection Time: 04/08/24 11:42 AM   Result Value Ref Range    Sodium 140 136 - 145 mmol/L    Potassium 4.1 3.5 - 5.1 mmol/L    Chloride 105 95 - 110 mmol/L    CO2 26 23 - 29 mmol/L    Glucose 83 70 - 110 mg/dL    BUN 10 6 - 20 mg/dL    Creatinine 0.9 0.5 - 1.4 mg/dL    Calcium 9.4 8.7 - 10.5 mg/dL    Total Protein 6.9 6.0 - 8.4 g/dL    Albumin 4.1 3.5 - 5.2 g/dL    Total Bilirubin 1.1 (H) 0.1 - 1.0 mg/dL    Alkaline Phosphatase 47 (L) 55 - 135 U/L    AST 21 10 - 40 U/L    ALT 11 10 - 44 U/L    eGFR >60 >60 mL/min/1.73 m^2    Anion Gap 9 8 - 16 mmol/L   Misc Sendout Test, Blood [facility]    Collection Time: 04/08/24 11:42 AM   Result Value Ref Range    Miscellaneous Test Name [facility]          Lab Results   Component Value Date    TBGOLDPLUS  Negative 07/21/2022      Lab Results   Component Value Date    HEPBCAB Negative 07/21/2022    HEPCAB Negative 07/21/2022        Imaging  I have personally reviewed images and reports as below.  I agree with the interpretation.  X-Ray Chest PA And Lateral  Narrative: EXAM: XR CHEST PA AND LATERAL    CLINICAL HISTORY:  Rheumatoid arthritis.    TECHNIQUE: 2 view chest x-ray.    FINDINGS: The heart size is normal. The lung fields are clear.  Impression:  No acute findings.    Finalized on: 2/23/2024 11:56 AM By:  Waldo Jarrell MD  [facility]# [id number]      2024-02-23 11:58:48.565    [facility]      Assessment:     1. Rheumatoid arthritis involving multiple sites with positive rheumatoid factor    2. Medication monitoring encounter    3. High risk medication use    4. Immunocompromised state due to drug therapy    5. Long-term use of Plaquenil          Plan:     Be was seen today for rheumatoid arthritis.    Diagnoses and all orders for this visit:    Rheumatoid arthritis involving multiple sites with positive rheumatoid factor  -     hydroxychloroquine (PLAQUENIL) 200 mg tablet; Take 1 tablet (200 mg total) by mouth 2 (two) times daily.  -     sulfaSALAzine (AZULFIDINE) 500 MG EC tablet; Take 1 tablet (500 mg total) by mouth 2 (two) times a day.    Medication monitoring encounter    High risk medication use    Immunocompromised state due to drug therapy    Long-term use of Plaquenil      Seropositive RA diagnosed in 2022  Failed methotrexate due to severe incapacitating fatigue  Labs reviewed as above and stable  C/w Plaq 200 mg bid  Pt aware of need for annual HCQ monitoring by ophthalmology  C/w  mg bid  Discussed increase to 1000mg bid - pt deferred for now as he is doing much better  Will let me know if he would like to increase it in future  Pt has deferred biologic for now  Has a phobia of needles  Prism RA is pending  Could consider rinvoq  May have difficulty w approval without failing tnf  Pt smoker - would  strongly encourage cessation  PDN 5 mg daily p.r.n. RA flares  Chronic fatigue  Likely multifactorial  DC LDN   Ineffective  Severe mood swings   Discuss w PCP - perhaps stimulant would be helpful - defer to primary care  Episode of pleurisy recently  Baseline CXR today reviewed as above and wnl  Low threshold to order chest CT if he has any recurring symptoms  Drug therapy requiring intensive monitoring for toxicity  High Risk Medication Monitoring encounter  No current medication related issues, no evidence of toxicity  I ordered labs for toxicity monitoring, have personally reviewed the findings, and discussed them with the patient.  Pending labs will be sent via the portal  Compromised immune system secondary to autoimmune disease and/or use of immunosuppressive drugs.  Monitor carefully for infections.  Advised patient to get immediate medical care if any infection arises.  Also advised strict adherence age-appropriate vaccinations and cancer screenings with PCP.  Patient advised to hold DMARD and/or biologic therapy for signs of infection or for surgery. If you are unsure what to do please call our office for instruction.[facility] [phone]  Return to clinic: CBC/CMP only in 4 weeks; Reg 4 + f/u in 3 mos    Follow up in about 3 months (around 7/12/2024).    The patient understands, chooses and consents to this plan and accepts all the risks which include but are not limited to the risks mentioned above.     Disclaimer: This note was prepared using a voice recognition system and is likely to have sound alike errors within the text.

## 2024-04-14 ENCOUNTER — PATIENT MESSAGE (OUTPATIENT)
Dept: INTERNAL MEDICINE | Facility: CLINIC | Age: 29
End: 2024-04-14
Payer: COMMERCIAL

## 2024-04-17 ENCOUNTER — OFFICE VISIT (OUTPATIENT)
Dept: INTERNAL MEDICINE | Facility: CLINIC | Age: 29
End: 2024-04-17
Payer: COMMERCIAL

## 2024-04-17 DIAGNOSIS — L74.8 FOOT ODOR: ICD-10-CM

## 2024-04-17 DIAGNOSIS — M06.9 RHEUMATOID ARTHRITIS, INVOLVING UNSPECIFIED SITE, UNSPECIFIED WHETHER RHEUMATOID FACTOR PRESENT: Primary | ICD-10-CM

## 2024-04-17 DIAGNOSIS — R53.83 FATIGUE, UNSPECIFIED TYPE: ICD-10-CM

## 2024-04-17 DIAGNOSIS — M25.50 ARTHRALGIA, UNSPECIFIED JOINT: ICD-10-CM

## 2024-04-17 PROCEDURE — 99213 OFFICE O/P EST LOW 20 MIN: CPT | Mod: 95,,, | Performed by: FAMILY MEDICINE

## 2024-04-17 NOTE — Clinical Note
DELORIS Mauricio put her in notes and had the patient come and as me for consideration of stimulant.  I am asking what your departmental thoughts are. Do you normally suggest stimulant for fatigue? I know he failed Naltraxone. However I do not prescribe controlled substances off label.  Is there literature that supports the use of stimulants for RA? If so, then I would refer to the Rheum department for prescribing.  IT would require a prior authorization.    Currently,  once I send patients for full psychosocial eval and it is deemed ADHD, ADD, or even sleep disorder that fail Nuvigil or PRovigil, I can get them approved.   Just wanted your thoughts on this. I didn't know how to handle this request from patient, and he was referred back to me to discuss it.  Thanks Lawanda

## 2024-04-17 NOTE — PROGRESS NOTES
Be Brito  04/17/2024  [id number]    Lawanda Martinez MD  Patient Care Team:  Lawanda Martinez MD as PCP - General (Family Medicine)    The patient location is: home  The chief complaint leading to consultation is: fatigue Feet pain    Visit type: audiovisual    Face to Face time with patient: 1;00  15 minutes of total time spent on the encounter, which includes face to face time and non-face to face time preparing to see the patient (eg, review of tests), Obtaining and/or reviewing separately obtained history, Documenting clinical information in the electronic or other health record, Independently interpreting results (not separately reported) and communicating results to the patient/family/caregiver, or Care coordination (not separately reported).         Each patient to whom he or she provides medical services by telemedicine is:  (1) informed of the relationship between the physician and patient and the respective role of any other health care provider with respect to management of the patient; and (2) notified that he or she may decline to receive medical services by telemedicine and may withdraw from such care at any time.    Notes:        Visit Type:an urgent visit for a new problem    Chief Complaint:  Adderall Foot     History of Present Illness:  29 year old seropositive RA on  mg b.i.d in addition to  mg bid.  Tolerating both well, though he initially felt worse w initiation of SSZ.  Says one day sxs just improved drastically.  Failed MTX due to severe incapacitating fatigue.  Happy w his progress since las visit.  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  Prism RA is pending.      Failed LDN for fatigue.  Says it has made him more irritable as well. Rheum suggested he ask PCP about adderall.     Having foot rash, odor, skin sensitive on feet. He has a lot of sweat.  He wants to see Podiatry for this.        History:  No past medical history on file.  Past Surgical  History:   Procedure Laterality Date    TONSILLECTOMY      trigger finger injection       Family History   Problem Relation Name Age of Onset    Lung cancer Mother      Leukemia Father      Osteoporosis Father      Arthritis Father      Colon cancer Neg Hx      Prostate cancer Neg Hx       Social History     Socioeconomic History    Marital status: Single    Number of children: 0   Tobacco Use    Smoking status: Every Day     Current packs/day: 1.00     Types: Cigarettes    Smokeless tobacco: Current   Substance and Sexual Activity    Alcohol use: Yes     Alcohol/week: 25.0 standard drinks of alcohol     Types: 25 Cans of beer per week    Drug use: Never     Social Determinants of Health     Stress: Stress Concern Present (10/23/2019)    [facility]ational [facility] - Occupational Stress Questionnaire     Feeling of Stress : Very much     There is no problem list on file for this patient.    Review of patient's allergies indicates:  No Known Allergies    The following were reviewed at this visit: active problem list, medication list, allergies, family history, social history, and health maintenance.    Medications:  Current Outpatient Medications on File Prior to Visit   Medication Sig Dispense Refill    acetaminophen (TYLENOL ORAL) Take by mouth.      azelastine-fluticasone (DYMISTA) 137-50 mcg/spray Spry nassal spray 2 sprays by Each Nostril route once daily. 23 g 5    hydroxychloroquine (PLAQUENIL) 200 mg tablet Take 1 tablet (200 mg total) by mouth 2 (two) times daily. 180 tablet 1    naproxen (NAPROSYN) 500 MG tablet Take 1 tablet (500 mg total) by mouth 2 (two) times daily with meals. 60 tablet 1    predniSONE (DELTASONE) 5 MG tablet Take 1 tablet (5 mg total) by mouth once daily. 30 tablet 1    sulfaSALAzine (AZULFIDINE) 500 MG EC tablet Take 1 tablet (500 mg total) by mouth 2 (two) times a day. 180 tablet 1     No current facility-administered medications on file prior to visit.       Medications  have been reviewed and reconciled with patient at this visit.  Barriers to medications reviewed with patient.    Adverse reactions to current medications reviewed with patient..    Over the counter medications reviewed and reconciled with patient.    Exam:  Wt Readings from Last 3 Encounters:   04/12/24 74.2 kg (163 lb 9.3 oz)   02/23/24 74.2 kg (163 lb 9.3 oz)   01/04/24 74 kg (163 lb 2.3 oz)     Temp Readings from Last 3 Encounters:   01/04/24 98.8 °F (37.1 °C) (Tympanic)   07/11/22 98.8 °F (37.1 °C) (Temporal)   11/06/19 97.7 °F (36.5 °C) (Tympanic)     BP Readings from Last 3 Encounters:   02/23/24 138/82   01/04/24 136/80   09/11/23 (!) 147/96     Pulse Readings from Last 3 Encounters:   02/23/24 86   01/04/24 79   09/11/23 84     There is no height or weight on file to calculate BMI.      Review of Systems   HENT:  Negative for hearing loss.    Eyes:  Negative for discharge.   Respiratory:  Negative for wheezing.    Cardiovascular:  Negative for chest pain and palpitations.   Gastrointestinal:  Negative for blood in stool, constipation, diarrhea and vomiting.   Genitourinary:  Negative for hematuria and urgency.   Musculoskeletal:  Positive for joint pain. Negative for neck pain.   Neurological:  Negative for weakness and headaches.   Endo/Heme/Allergies:  Negative for polydipsia.     Physical Exam  Nursing note reviewed.   Pulmonary:      Effort: Pulmonary effort is normal. No respiratory distress.   Neurological:      Mental Status: He is alert and oriented to person, place, and time.   Psychiatric:         Mood and Affect: Mood normal.         Behavior: Behavior normal.         Thought Content: Thought content normal.         Judgment: Judgment normal.         Laboratory Reviewed ({Yes)  Lab Results   Component Value Date    WBC 7.05 04/08/2024    HGB 14.3 04/08/2024    HCT 42.4 04/08/2024     04/08/2024    CHOL 161 11/06/2019    TRIG 35 11/06/2019    HDL 65 11/06/2019    ALT 11 04/08/2024    AST 21  04/08/2024     04/08/2024    K 4.1 04/08/2024     04/08/2024    CREATININE 0.9 04/08/2024    BUN 10 04/08/2024    CO2 26 04/08/2024    TSH 0.721 06/02/2023       There are no diagnoses linked to this encounter.            Care Plan/Goals: Reviewed    Goals    None         Follow up: No follow-ups on file.    After visit summary was printed and given to patient upon discharge today.  Patient goals and care plan are included in After Visit Summary.    Answers submitted by the patient for this visit:  Review of Systems Questionnaire (Submitted on 4/17/2024)  activity change: No  unexpected weight change: No  rhinorrhea: No  trouble swallowing: No  chest tightness: No  polyuria: No  difficulty urinating: No  joint swelling: No  arthralgias: No  confusion: No  dysphoric mood: No

## 2024-04-17 NOTE — TELEPHONE ENCOUNTER
PrismRA results uploaded into chart under media tab.   Patient's PrismRA score is 1.5. A patient with a PrismRA Score <10.6 does not have a detectable molecular signature of inadequate response to TNFi therapies. The PrismRA result is reported on a continuous 1-25 scale. The higher the score, the more likely the patient will have an inadequate response to TNFi therapies; the lower the score, the less likely the patient will have an inadequate response to TNFi therapies.

## 2024-04-19 ENCOUNTER — TELEPHONE (OUTPATIENT)
Dept: INTERNAL MEDICINE | Facility: CLINIC | Age: 29
End: 2024-04-19
Payer: COMMERCIAL

## 2024-04-19 ENCOUNTER — PATIENT MESSAGE (OUTPATIENT)
Dept: INTERNAL MEDICINE | Facility: CLINIC | Age: 29
End: 2024-04-19
Payer: COMMERCIAL

## 2024-04-19 NOTE — TELEPHONE ENCOUNTER
Notified patient of need for psych eval for stimulants. Patient is willing. Would like to know how he should proceed?

## 2024-04-25 ENCOUNTER — TELEPHONE (OUTPATIENT)
Dept: INTERNAL MEDICINE | Facility: CLINIC | Age: 29
End: 2024-04-25
Payer: COMMERCIAL

## 2024-04-25 NOTE — TELEPHONE ENCOUNTER
"Faxed patient referral to [facility]        4/25/2024 11:18:39 AM Transmission Record          Sent to [phone] with remote ID "[id number]      "          Result: (0/339;0/0) Success          Page record: 1 - 10          Elapsed time: 04:45 on channel 62   "

## 2024-04-29 ENCOUNTER — PATIENT MESSAGE (OUTPATIENT)
Dept: INTERNAL MEDICINE | Facility: CLINIC | Age: 29
End: 2024-04-29
Payer: COMMERCIAL

## 2024-05-06 ENCOUNTER — OFFICE VISIT (OUTPATIENT)
Dept: INTERNAL MEDICINE | Facility: CLINIC | Age: 29
End: 2024-05-06
Payer: COMMERCIAL

## 2024-05-06 ENCOUNTER — OFFICE VISIT (OUTPATIENT)
Dept: PODIATRY | Facility: CLINIC | Age: 29
End: 2024-05-06
Payer: COMMERCIAL

## 2024-05-06 VITALS — HEIGHT: 69 IN | BODY MASS INDEX: 24.23 KG/M2 | WEIGHT: 163.56 LBS

## 2024-05-06 DIAGNOSIS — R53.83 FATIGUE, UNSPECIFIED TYPE: Primary | ICD-10-CM

## 2024-05-06 DIAGNOSIS — B35.3 TINEA PEDIS OF BOTH FEET: ICD-10-CM

## 2024-05-06 DIAGNOSIS — L75.0 BROMHIDROSIS: Primary | ICD-10-CM

## 2024-05-06 DIAGNOSIS — L74.8 FOOT ODOR: ICD-10-CM

## 2024-05-06 DIAGNOSIS — L08.89 PITTED KERATOLYSIS: ICD-10-CM

## 2024-05-06 DIAGNOSIS — M06.9 RHEUMATOID ARTHRITIS, INVOLVING UNSPECIFIED SITE, UNSPECIFIED WHETHER RHEUMATOID FACTOR PRESENT: ICD-10-CM

## 2024-05-06 LAB
MISCELLANEOUS TEST NAME: NORMAL
REFERENCE LAB: NORMAL
SPECIMEN TYPE: NORMAL
TEST RESULT: NORMAL

## 2024-05-06 PROCEDURE — 99204 OFFICE O/P NEW MOD 45 MIN: CPT | Mod: S$GLB,,, | Performed by: PODIATRIST

## 2024-05-06 PROCEDURE — 99999 PR PBB SHADOW E&M-EST. PATIENT-LVL IV: CPT | Mod: PBBFAC,,, | Performed by: PODIATRIST

## 2024-05-06 PROCEDURE — 1159F MED LIST DOCD IN RCRD: CPT | Mod: CPTII,S$GLB,, | Performed by: PODIATRIST

## 2024-05-06 PROCEDURE — 3008F BODY MASS INDEX DOCD: CPT | Mod: CPTII,S$GLB,, | Performed by: PODIATRIST

## 2024-05-06 PROCEDURE — 99213 OFFICE O/P EST LOW 20 MIN: CPT | Mod: 95,,, | Performed by: FAMILY MEDICINE

## 2024-05-06 RX ORDER — CICLOPIROX 7.7 MG/G
GEL TOPICAL 2 TIMES DAILY
Qty: 30 G | Refills: 1 | Status: SHIPPED | OUTPATIENT
Start: 2024-05-06

## 2024-05-06 RX ORDER — ERYTHROMYCIN 20 MG/G
GEL TOPICAL DAILY
Qty: 30 G | Refills: 2 | Status: SHIPPED | OUTPATIENT
Start: 2024-05-06

## 2024-05-06 NOTE — PROGRESS NOTES
Podiatry Department    Patient ID: Be Brito is a 29 y.o. male.    Chief Complaint: Foot Problem (C/o foot odor, b/l, ball of foot, x several years, non-diabetic, wears casual shoes and socks)    History of Present Illness    CHIEF COMPLAINT:  Patient presents today for foot odor.    FOOT CONCERNS:  Patient reports persistent foot odor that has increased in severity, particularly noticed at home. His feet sweat significantly at his work as a  where he wears steel-toed shoes. Temporary relief up to two days has been achieved with Epsom salt in hot water. Additionally, part of his feet has started turning white, seemingly contributing to the odor increase. He reports high foot sensitivity and an aversion to being barefoot.          Physical Exam          There are scales interdigital bilateral foot  There is moisture and slight maceration b/l plantar forefoot     Diagnoses:  1. Bromhidrosis    2. Foot odor  -     Ambulatory referral/consult to Podiatry    3. Tinea pedis of both feet    4. Pitted keratolysis    Other orders  -     aluminum chloride (DRYSOL) 20 % external solution; Apply topically every evening.  Dispense: 35 mL; Refill: 0  -     ciclopirox 0.77 % Gel; Apply topically 2 (two) times daily. In between toe webspaces  Dispense: 30 g; Refill: 1  -     erythromycin with ethanoL (ERYGEL) 2 % gel; Apply topically once daily. For 14 days to bottom of feet.  Dispense: 30 g; Refill: 2        Assessment & Plan    ATHLETE'S FOOT INFECTION:  - Prescribed an antifungal gel for the patient's athlete's foot infection.  - Advised a treatment period of 2 weeks for these medications.  - Recommend the patient to continue using an OTC antifungal powder indefinitely after the initial treatment period.  - Emphasized the importance of maintaining a dry environment for the feet to prevent the recurrence of the fungal infection and odor.  - Educated the patient about his conditions, and the role of fungus in  causing these conditions.  - Explained the need for regular use of the prescribed medications and OTC antifungal powder.  BROMO HYPERHIDROSIS:  - Prescribed Drysol, an aluminum chloride-based medication and drying agent, for the patient's bromo hyperhidrosis.  - Recommend the patient to continue using Drysol indefinitely after the initial treatment period.  - Educated the patient about his conditions, bromo hyperhidrosis, and the role of bacteria in causing these conditions.  - Informed the patient about the possibility of Botox injections as a future treatment option.  PITTED KERATOLYSIS:  - Educated the patient about his conditions, pitted keratolysis, and the role of bacteria and fungus in causing these conditions.           Future Appointments   Date Time Provider Department [place]   5/6/2024  1:00 PM Lawanda Martinez MD [facility]   5/10/2024  4:30 PM LABORATORY, [facility] [place]   7/12/2024  4:30 PM LABORATORY, [facility] [place]   7/15/2024  2:30 PM Carlos Luo MD [facility]        This note was generated with the assistance of ambient listening technology. Verbal consent was obtained by the patient and accompanying visitor(s) for the recording of patient appointment to facilitate this note. I attest to having reviewed and edited the generated note for accuracy, though some syntax or spelling errors may persist. Please contact the author of this note for any clarification.      Report Electronically Signed By:     Amy Cardona DPM   Podiatry  [facility]-   5/6/2024

## 2024-05-06 NOTE — PROGRESS NOTES
Be Brito  05/06/2024  [id number]    Lawanda Martinez MD  Patient Care Team:  Lawanda Martinez MD as PCP - General (Family Medicine)      The patient location is: Home  The chief complaint leading to consultation is: Stimuant and fatigue    Visit type: audiovisual    Face to Face time with patient: 1  10 minutes of total time spent on the encounter, which includes face to face time and non-face to face time preparing to see the patient (eg, review of tests), Obtaining and/or reviewing separately obtained history, Documenting clinical information in the electronic or other health record, Independently interpreting results (not separately reported) and communicating results to the patient/family/caregiver, or Care coordination (not separately reported).         Each patient to whom he or she provides medical services by telemedicine is:  (1) informed of the relationship between the physician and patient and the respective role of any other health care provider with respect to management of the patient; and (2) notified that he or she may decline to receive medical services by telemedicine and may withdraw from such care at any time.    Notes:      Visit Type:a scheduled routine follow-up visit    Chief Complaint:  No chief complaint on file.      History of Present Illness:    Patient asked me to Rx stimulant for his over fatigue. He said his Rheum suggested to talk to PCP about stimulant.    I then messaged Rheum Staff and they said to get him eval for stimulant use.  I sent him for ADD and stimulant work up, at [facility]. They did not eval for ADD as he has no concentration issues.    He returns back due to the fatigue.  I cannot Rx stimulants unless he has a qualifying dx.  I again suggest he follows back up with RHeum and if they want to consider another tx for his fatigue.  I do nto have a qualifying Dx to Rx a scheduled medication.  I had asked Dr. MESA in Rheum and he suggested the [facility]  consult.    Answers submitted by the patient for this visit:  Review of Systems Questionnaire (Submitted on 5/6/2024)  activity change: No  unexpected weight change: No  rhinorrhea: No  trouble swallowing: No  visual disturbance: No  chest tightness: No  polyuria: No  difficulty urinating: No  joint swelling: No  arthralgias: No  confusion: No  dysphoric mood: No    History:  No past medical history on file.  Past Surgical History:   Procedure Laterality Date    TONSILLECTOMY      trigger finger injection       Family History   Problem Relation Name Age of Onset    Lung cancer Mother      Leukemia Father      Osteoporosis Father      Arthritis Father      Colon cancer Neg Hx      Prostate cancer Neg Hx       Social History     Socioeconomic History    Marital status: Single    Number of children: 0   Tobacco Use    Smoking status: Every Day     Current packs/day: 1.00     Types: Cigarettes    Smokeless tobacco: Current   Substance and Sexual Activity    Alcohol use: Yes     Alcohol/week: 25.0 standard drinks of alcohol     Types: 25 Cans of beer per week    Drug use: Never     Social Determinants of Health     Financial Resource Strain: Patient Declined (4/17/2024)    Overall Financial Resource Strain (CARDIA)     Difficulty of Paying Living Expenses: Patient declined   Food Insecurity: Patient Declined (4/17/2024)    Hunger Vital Sign     Worried About Running Out of Food in the Last Year: Patient declined     Ran Out of Food in the Last Year: Patient declined   Transportation Needs: No Transportation Needs (4/17/2024)    PRAPARE - Transportation     Lack of Transportation (Medical): No     Lack of Transportation (Non-Medical): No   Physical Activity: Unknown (4/17/2024)    Exercise Vital Sign     Days of Exercise per Week: 5 days     Minutes of Exercise per Session: Patient declined   Stress: Stress Concern Present (4/17/2024)    [employer] [place] [facility] [employer]ational [employer] - Occupational Stress Questionnaire      Feeling of Stress : To some extent   Housing Stability: Unknown (4/17/2024)    Housing Stability Vital Sign     Unable to Pay for Housing in the Last Year: Patient declined     There is no problem list on file for this patient.    Review of patient's allergies indicates:  No Known Allergies    The following were reviewed at this visit: active problem list, medication list, allergies, family history, social history, and health maintenance.    Medications:  Current Outpatient Medications on File Prior to Visit   Medication Sig Dispense Refill    acetaminophen (TYLENOL ORAL) Take by mouth.      aluminum chloride (DRYSOL) 20 % external solution Apply topically every evening. 35 mL 0    azelastine-fluticasone (DYMISTA) 137-50 mcg/spray Spry nassal spray 2 sprays by Each Nostril route once daily. 23 g 5    ciclopirox 0.77 % Gel Apply topically 2 (two) times daily. In between toe webspaces 30 g 1    erythromycin with ethanoL (ERYGEL) 2 % gel Apply topically once daily. For 14 days to bottom of feet. 30 g 2    hydroxychloroquine (PLAQUENIL) 200 mg tablet Take 1 tablet (200 mg total) by mouth 2 (two) times daily. 180 tablet 1    naproxen (NAPROSYN) 500 MG tablet Take 1 tablet (500 mg total) by mouth 2 (two) times daily with meals. (Patient not taking: Reported on 5/6/2024) 60 tablet 1    predniSONE (DELTASONE) 5 MG tablet Take 1 tablet (5 mg total) by mouth once daily. 30 tablet 1    sulfaSALAzine (AZULFIDINE) 500 MG EC tablet Take 1 tablet (500 mg total) by mouth 2 (two) times a day. 180 tablet 1     No current facility-administered medications on file prior to visit.       Medications have been reviewed and reconciled with patient at this visit.  Barriers to medications reviewed with patient.    Adverse reactions to current medications reviewed with patient..    Over the counter medications reviewed and reconciled with patient.    Exam:  Wt Readings from Last 3 Encounters:   05/06/24 74.2 kg (163 lb 9.3 oz)   04/12/24 74.2  kg (163 lb 9.3 oz)   02/23/24 74.2 kg (163 lb 9.3 oz)     Temp Readings from Last 3 Encounters:   01/04/24 98.8 °F (37.1 °C) (Tympanic)   07/11/22 98.8 °F (37.1 °C) (Temporal)   11/06/19 97.7 °F (36.5 °C) (Tympanic)     BP Readings from Last 3 Encounters:   02/23/24 138/82   01/04/24 136/80   09/11/23 (!) 147/96     Pulse Readings from Last 3 Encounters:   02/23/24 86   01/04/24 79   09/11/23 84     There is no height or weight on file to calculate BMI.      Review of Systems   Constitutional:  Positive for malaise/fatigue.   HENT:  Negative for hearing loss.    Eyes:  Negative for discharge.   Respiratory:  Negative for wheezing.    Cardiovascular:  Negative for chest pain and palpitations.   Gastrointestinal:  Negative for blood in stool, constipation, diarrhea and vomiting.   Genitourinary:  Negative for hematuria and urgency.   Musculoskeletal:  Negative for neck pain.   Neurological:  Negative for weakness and headaches.   Endo/Heme/Allergies:  Negative for polydipsia.     Physical Exam  Nursing note reviewed.   Pulmonary:      Effort: Pulmonary effort is normal. No respiratory distress.   Neurological:      Mental Status: He is alert and oriented to person, place, and time.   Psychiatric:         Mood and Affect: Mood normal.         Behavior: Behavior normal.         Thought Content: Thought content normal.         Judgment: Judgment normal.         Laboratory Reviewed ({Yes)  Lab Results   Component Value Date    WBC 7.05 04/08/2024    HGB 14.3 04/08/2024    HCT 42.4 04/08/2024     04/08/2024    CHOL 161 11/06/2019    TRIG 35 11/06/2019    HDL 65 11/06/2019    ALT 11 04/08/2024    AST 21 04/08/2024     04/08/2024    K 4.1 04/08/2024     04/08/2024    CREATININE 0.9 04/08/2024    BUN 10 04/08/2024    CO2 26 04/08/2024    TSH 0.721 06/02/2023       Diagnoses and all orders for this visit:    Fatigue, unspecified type    Rheumatoid arthritis, involving unspecified site, unspecified whether  rheumatoid factor present      I again explained that I will defer back to RHeum about his fatigue.  He was referred to [facility], and they did not have psych dx to write stimulants.    He also has a Pending RA Prism lab that has not been resulted    I will route chart back to staff to ask them to see him back in office regarding his continued sx.  He reports MTX and Naltraxolone did not help at all.   He is currently on Sulfasalazine and Plq and still with extreme fatigue          Care Plan/Goals: Reviewed    Goals    None         Follow up: No follow-ups on file.    After visit summary was printed and given to patient upon discharge today.  Patient goals and care plan are included in After Visit Summary.

## 2024-05-06 NOTE — PATIENT INSTRUCTIONS
Thank you for choosing [facility] and Dr. Amy Cardona and her team to take care of you.      I have provided further information for you about your diagnoses and/or aftercare for treatment.     Apply Ciclopirox gel inbetween toe webspaces for 2 weeks  Apply ERYTHROMYCIN gel and then DRYSOL to bottom of feet daily to help with sweaty feet  Apply ZEASORB powder daily as last step. This can be purchased over the counter.   Change socks mid-day to keep feet dry.  Wear white socks and bleach socks to decrease infection.    You may receive a survey asking you about your visit today. We hope that we have provided you with a 5-star experience! If you felt that you received exemplary care today, please consider leaving us this feedback on the survey that you will receive in the next few days.     The survey might arrive via email, [employer] messages, text messages, or paper mail.    We sincerely appreciate you!      Sincerely,  Dr. Amy Cardona         What is pitted keratolysis?  Pitted keratolysis is a bacterial skin infection that causes a foul odor and itchiness. It most often affects the skin on your feet. Youre most at risk of developing pitted keratolysis if you have sweaty feet and wear shoes with limited airflow.  Who does pitted keratolysis affect?  Pitted keratolysis can affect anyone who has feet but its most common among men and people assigned male at birth (AMAB).  You might be more at risk of developing pitted keratolysis if you:  Wear shoes and/or socks that are too tight (occlusive) and have limited airflow.  Sweat excessively (hyperhidrosis).  Live in a warm or tropical climate.  Work or participate in activities where youre outdoors and on your feet for most of the day.  How common is pitted keratolysis?  Pitted keratolysis is common among people who work in certain occupations where you spend most of your day on your feet or among people who participate in strenuous physical activities,  including:   personnel.  Athletes.  [place].  .    Symptoms and Causes  What are the symptoms of pitted keratolysis?  Symptoms of pitted keratolysis include:  Smelly feet (bromhidrosis).  White patch of skin or a patch thats lighter than your natural skin tone.  Pits or tiny indentations in the patch of skin that look like small holes.  Pits can cluster together to form a sore (lesion) that looks like a crater.  Itchy sensation on your affected skin.  You might notice your symptoms become more apparent when your skin is wet.  Where do symptoms of pitted keratolysis affect my body?  Symptoms of pitted keratolysis affect your feet, specifically the web spaces between your toes and the ball, heel and soles of your foot. Although rare, it can also affect the palm of your hand.  What causes pitted keratolysis?  A bacterial infection causes pitted keratolysis. The most common types of bacteria that can cause infection include:  Actinomyces.  Corynebacteria.  Dermatophilus congolensis.  Kytococcus sedentarius.  Streptomyces.  These types of bacteria grow and thrive in warm and moist conditions, which is why it commonly affects your feet if youre wearing tight socks and shoes with minimal airflow.  Your skin pits or forms small indentations because the bacteria (protease enzyme) destroys layers of your skin cells that continuously shed dead skin cells (stratum corneum or the horny layer).  The odor caused by the infection occurs because the bacteria release a stinky sulfur compound.  Is pitted keratolysis contagious?  No, pitted keratolysis isnt contagious and cant spread from person to person.    Diagnosis and Tests  How is pitted keratolysis diagnosed?  Your healthcare provider will diagnose pitted keratolysis after reviewing your complete medical history and performing a physical exam to learn more about your symptoms. They might ask questions about the type of shoes and socks you wear,  especially around the time you noticed your symptoms.  Tests arent always necessary, as pitted keratolysis has a distinct appearance on your skin, but your healthcare provider might offer tests to rule out similar conditions, which could include:  Culture test: Your healthcare provider will rub a swab over your skin or scrape a small amount of your affected skin off to collect some of the bacteria to identify it.  Skin biopsy: Your healthcare provider will remove a small sample of your skin tissue to examine it under a microscope.    Management and Treatment  How is pitted keratolysis treated?  Treatment for pitted keratolysis focuses on removing the bacterial infection from your body. Your healthcare provider will prescribe topical antibiotics that you rub on your skin like a lotion. Antibiotics could include:  Clindamycin.  Erythromycin.  Fusidic acid.  Mupirocin.  Antiseptics like benzoyl peroxide gel or cream can help clear the infection from your feet.  If you have a condition that causes excessive sweating, like hyperhidrosis, your healthcare provider will offer additional treatment for your sweating to prevent pitted keratolysis from returning in the future.  The condition rarely goes away on its own without treatment from your healthcare provider.

## 2024-05-08 ENCOUNTER — PATIENT MESSAGE (OUTPATIENT)
Dept: RHEUMATOLOGY | Facility: CLINIC | Age: 29
End: 2024-05-08
Payer: COMMERCIAL

## 2024-05-10 ENCOUNTER — LAB VISIT (OUTPATIENT)
Dept: LAB | Facility: HOSPITAL | Age: 29
End: 2024-05-10
Attending: PHYSICIAN ASSISTANT
Payer: COMMERCIAL

## 2024-05-10 DIAGNOSIS — D84.9 IMMUNOCOMPROMISED PATIENT: ICD-10-CM

## 2024-05-10 DIAGNOSIS — M05.79 RHEUMATOID ARTHRITIS INVOLVING MULTIPLE SITES WITH POSITIVE RHEUMATOID FACTOR: ICD-10-CM

## 2024-05-10 DIAGNOSIS — Z79.899 LONG-TERM USE OF PLAQUENIL: ICD-10-CM

## 2024-05-10 DIAGNOSIS — M25.50 POLYARTHRALGIA: ICD-10-CM

## 2024-05-10 DIAGNOSIS — Z51.81 MEDICATION MONITORING ENCOUNTER: ICD-10-CM

## 2024-05-10 DIAGNOSIS — M06.9 RHEUMATOID ARTHRITIS FLARE: ICD-10-CM

## 2024-05-10 DIAGNOSIS — Z79.899 HIGH RISK MEDICATION USE: ICD-10-CM

## 2024-05-10 LAB
ALBUMIN SERPL BCP-MCNC: 4.3 G/DL (ref 3.5–5.2)
ALP SERPL-CCNC: 51 U/L (ref 55–135)
ALT SERPL W/O P-5'-P-CCNC: 9 U/L (ref 10–44)
ANION GAP SERPL CALC-SCNC: 9 MMOL/L (ref 8–16)
AST SERPL-CCNC: 21 U/L (ref 10–40)
BASOPHILS # BLD AUTO: 0.05 K/UL (ref 0–0.2)
BASOPHILS NFR BLD: 0.9 % (ref 0–1.9)
BILIRUB SERPL-MCNC: 0.9 MG/DL (ref 0.1–1)
BUN SERPL-MCNC: 20 MG/DL (ref 6–20)
CALCIUM SERPL-MCNC: 9.7 MG/DL (ref 8.7–10.5)
CHLORIDE SERPL-SCNC: 106 MMOL/L (ref 95–110)
CO2 SERPL-SCNC: 23 MMOL/L (ref 23–29)
CREAT SERPL-MCNC: 1.2 MG/DL (ref 0.5–1.4)
DIFFERENTIAL METHOD BLD: ABNORMAL
EOSINOPHIL # BLD AUTO: 0.1 K/UL (ref 0–0.5)
EOSINOPHIL NFR BLD: 0.9 % (ref 0–8)
ERYTHROCYTE [DISTWIDTH] IN BLOOD BY AUTOMATED COUNT: 12.7 % (ref 11.5–14.5)
EST. GFR  (NO RACE VARIABLE): >60 ML/MIN/1.73 M^2
GLUCOSE SERPL-MCNC: 86 MG/DL (ref 70–110)
HCT VFR BLD AUTO: 40.7 % (ref 40–54)
HGB BLD-MCNC: 13.7 G/DL (ref 14–18)
IMM GRANULOCYTES # BLD AUTO: 0.02 K/UL (ref 0–0.04)
IMM GRANULOCYTES NFR BLD AUTO: 0.4 % (ref 0–0.5)
LYMPHOCYTES # BLD AUTO: 1.9 K/UL (ref 1–4.8)
LYMPHOCYTES NFR BLD: 33.9 % (ref 18–48)
MCH RBC QN AUTO: 31.2 PG (ref 27–31)
MCHC RBC AUTO-ENTMCNC: 33.7 G/DL (ref 32–36)
MCV RBC AUTO: 93 FL (ref 82–98)
MONOCYTES # BLD AUTO: 0.5 K/UL (ref 0.3–1)
MONOCYTES NFR BLD: 8.5 % (ref 4–15)
NEUTROPHILS # BLD AUTO: 3.2 K/UL (ref 1.8–7.7)
NEUTROPHILS NFR BLD: 55.4 % (ref 38–73)
NRBC BLD-RTO: 0 /100 WBC
PLATELET # BLD AUTO: 233 K/UL (ref 150–450)
PMV BLD AUTO: 10.1 FL (ref 9.2–12.9)
POTASSIUM SERPL-SCNC: 4 MMOL/L (ref 3.5–5.1)
PROT SERPL-MCNC: 7.4 G/DL (ref 6–8.4)
RBC # BLD AUTO: 4.39 M/UL (ref 4.6–6.2)
SODIUM SERPL-SCNC: 138 MMOL/L (ref 136–145)
WBC # BLD AUTO: 5.67 K/UL (ref 3.9–12.7)

## 2024-05-10 PROCEDURE — 85025 COMPLETE CBC W/AUTO DIFF WBC: CPT | Performed by: PHYSICIAN ASSISTANT

## 2024-05-10 PROCEDURE — 36415 COLL VENOUS BLD VENIPUNCTURE: CPT | Mod: PN | Performed by: PHYSICIAN ASSISTANT

## 2024-05-10 PROCEDURE — 80053 COMPREHEN METABOLIC PANEL: CPT | Performed by: PHYSICIAN ASSISTANT

## 2024-05-23 ENCOUNTER — LAB VISIT (OUTPATIENT)
Dept: LAB | Facility: HOSPITAL | Age: 29
End: 2024-05-23
Attending: INTERNAL MEDICINE
Payer: COMMERCIAL

## 2024-05-23 ENCOUNTER — OFFICE VISIT (OUTPATIENT)
Dept: RHEUMATOLOGY | Facility: CLINIC | Age: 29
End: 2024-05-23
Payer: COMMERCIAL

## 2024-05-23 ENCOUNTER — TELEPHONE (OUTPATIENT)
Dept: RHEUMATOLOGY | Facility: CLINIC | Age: 29
End: 2024-05-23

## 2024-05-23 VITALS
WEIGHT: 163.13 LBS | HEIGHT: 69 IN | DIASTOLIC BLOOD PRESSURE: 90 MMHG | BODY MASS INDEX: 24.16 KG/M2 | SYSTOLIC BLOOD PRESSURE: 130 MMHG | HEART RATE: 80 BPM

## 2024-05-23 DIAGNOSIS — D50.8 ACQUIRED IRON DEFICIENCY ANEMIA DUE TO DECREASED ABSORPTION: ICD-10-CM

## 2024-05-23 DIAGNOSIS — R53.82 CHRONIC FATIGUE: ICD-10-CM

## 2024-05-23 DIAGNOSIS — Z51.81 ENCOUNTER FOR MEDICATION MONITORING: ICD-10-CM

## 2024-05-23 DIAGNOSIS — Z79.899 DRUG-INDUCED IMMUNODEFICIENCY: ICD-10-CM

## 2024-05-23 DIAGNOSIS — M05.79 RHEUMATOID ARTHRITIS INVOLVING MULTIPLE SITES WITH POSITIVE RHEUMATOID FACTOR: ICD-10-CM

## 2024-05-23 DIAGNOSIS — F40.231 SEVERE NEEDLE PHOBIA: ICD-10-CM

## 2024-05-23 DIAGNOSIS — M06.9 FLARE OF RHEUMATOID ARTHRITIS: ICD-10-CM

## 2024-05-23 DIAGNOSIS — D84.821 DRUG-INDUCED IMMUNODEFICIENCY: ICD-10-CM

## 2024-05-23 DIAGNOSIS — M06.9 FLARE OF RHEUMATOID ARTHRITIS: Primary | ICD-10-CM

## 2024-05-23 LAB
CRP SERPL-MCNC: 8 MG/L (ref 0–8.2)
ERYTHROCYTE [SEDIMENTATION RATE] IN BLOOD BY PHOTOMETRIC METHOD: 14 MM/HR (ref 0–23)
FERRITIN SERPL-MCNC: 150 NG/ML (ref 20–300)
HBV CORE AB SERPL QL IA: NORMAL
HBV SURFACE AB SER-ACNC: <3 MIU/ML
HBV SURFACE AB SER-ACNC: NORMAL M[IU]/ML
HBV SURFACE AG SERPL QL IA: NORMAL
IRON SERPL-MCNC: 54 UG/DL (ref 45–160)
SATURATED IRON: 14 % (ref 20–50)
TOTAL IRON BINDING CAPACITY: 379 UG/DL (ref 250–450)
TRANSFERRIN SERPL-MCNC: 256 MG/DL (ref 200–375)

## 2024-05-23 PROCEDURE — 83540 ASSAY OF IRON: CPT | Performed by: INTERNAL MEDICINE

## 2024-05-23 PROCEDURE — 86480 TB TEST CELL IMMUN MEASURE: CPT | Performed by: INTERNAL MEDICINE

## 2024-05-23 PROCEDURE — 3075F SYST BP GE 130 - 139MM HG: CPT | Mod: CPTII,S$GLB,, | Performed by: INTERNAL MEDICINE

## 2024-05-23 PROCEDURE — 1159F MED LIST DOCD IN RCRD: CPT | Mod: CPTII,S$GLB,, | Performed by: INTERNAL MEDICINE

## 2024-05-23 PROCEDURE — 82728 ASSAY OF FERRITIN: CPT | Performed by: INTERNAL MEDICINE

## 2024-05-23 PROCEDURE — 86704 HEP B CORE ANTIBODY TOTAL: CPT | Performed by: INTERNAL MEDICINE

## 2024-05-23 PROCEDURE — 3080F DIAST BP >= 90 MM HG: CPT | Mod: CPTII,S$GLB,, | Performed by: INTERNAL MEDICINE

## 2024-05-23 PROCEDURE — 86706 HEP B SURFACE ANTIBODY: CPT | Mod: 91 | Performed by: INTERNAL MEDICINE

## 2024-05-23 PROCEDURE — 87340 HEPATITIS B SURFACE AG IA: CPT | Performed by: INTERNAL MEDICINE

## 2024-05-23 PROCEDURE — 1160F RVW MEDS BY RX/DR IN RCRD: CPT | Mod: CPTII,S$GLB,, | Performed by: INTERNAL MEDICINE

## 2024-05-23 PROCEDURE — 99999 PR PBB SHADOW E&M-EST. PATIENT-LVL IV: CPT | Mod: PBBFAC,,, | Performed by: INTERNAL MEDICINE

## 2024-05-23 PROCEDURE — 85652 RBC SED RATE AUTOMATED: CPT | Performed by: INTERNAL MEDICINE

## 2024-05-23 PROCEDURE — 3008F BODY MASS INDEX DOCD: CPT | Mod: CPTII,S$GLB,, | Performed by: INTERNAL MEDICINE

## 2024-05-23 PROCEDURE — 86140 C-REACTIVE PROTEIN: CPT | Performed by: INTERNAL MEDICINE

## 2024-05-23 PROCEDURE — 99215 OFFICE O/P EST HI 40 MIN: CPT | Mod: S$GLB,,, | Performed by: INTERNAL MEDICINE

## 2024-05-23 PROCEDURE — 36415 COLL VENOUS BLD VENIPUNCTURE: CPT | Performed by: INTERNAL MEDICINE

## 2024-05-23 RX ORDER — FERROUS SULFATE 324(65)MG
324 TABLET, DELAYED RELEASE (ENTERIC COATED) ORAL DAILY
Qty: 90 TABLET | Refills: 3 | Status: SHIPPED | OUTPATIENT
Start: 2024-05-23

## 2024-05-23 RX ORDER — METHYLPREDNISOLONE 4 MG/1
TABLET ORAL
Qty: 21 TABLET | Refills: 0 | Status: SHIPPED | OUTPATIENT
Start: 2024-05-23

## 2024-05-23 RX ORDER — PREDNISONE 5 MG/1
5 TABLET ORAL DAILY
Qty: 90 TABLET | Refills: 1 | Status: SHIPPED | OUTPATIENT
Start: 2024-05-23

## 2024-05-23 RX ORDER — UPADACITINIB 15 MG/1
15 TABLET, EXTENDED RELEASE ORAL DAILY
Qty: 30 TABLET | Refills: 11 | Status: ACTIVE | OUTPATIENT
Start: 2024-05-23

## 2024-05-23 NOTE — PROGRESS NOTES
RHEUMATOLOGY CLINIC FOLLOW UP VISIT  Chief complaints, HPI, ROS, EXAM, Assessment & Plans:-  Be Bhatia a 29 y.o. pleasant male comes in for follow-up visit.  Seropositive nonerosive rheumatoid arthritis comes in today as advised by primary care physician for worsening fatigue.  Also has active symptoms of rheumatoid with inflammatory joint pain affecting small and large joints with morning stiffness lasting less than an hour.  Severe fatigue for several years.  Limited workup has been negative so far including normal serum testosterone and thyroid functions.  Significant daytime somnolence since present.  Wakes up unrefreshed.  Rheumatological review of system negative otherwise.  Physical examination shows active synovitis over multiple bilateral MCP, PIP joints.  Positive MTP squeeze test.  Mild effusion of bilateral ankles..    1. Flare of rheumatoid arthritis    2. Rheumatoid arthritis involving multiple sites with positive rheumatoid factor    3. Severe needle phobia    4. Chronic fatigue    5. Acquired iron deficiency anemia due to decreased absorption    6. Drug-induced immunodeficiency    7. Encounter for medication monitoring      Problem List Items Addressed This Visit       Chronic fatigue    Relevant Medications    ferrous sulfate 324 mg (65 mg iron) TbEC    Other Relevant Orders    FERRITIN    IRON AND TIBC    Ambulatory referral/consult to Sleep Disorders    Flare of rheumatoid arthritis - Primary    Relevant Medications    predniSONE (DELTASONE) 5 MG tablet    upadacitinib (RINVOQ) 15 mg 24 hr tablet    methylPREDNISolone (MEDROL DOSEPACK) 4 mg tablet    Other Relevant Orders    Sedimentation rate    C-Reactive Protein    Hepatitis B Core Antibody, Total    Hepatitis B Surface Ab, Qualitative    Hepatitis B Surface Antigen    Quantiferon Gold TB    Rheumatoid arthritis involving multiple sites with positive rheumatoid factor    Relevant  Medications    predniSONE (DELTASONE) 5 MG tablet    upadacitinib (RINVOQ) 15 mg 24 hr tablet    methylPREDNISolone (MEDROL DOSEPACK) 4 mg tablet    Other Relevant Orders    Hepatitis B Core Antibody, Total    Hepatitis B Surface Ab, Qualitative    Hepatitis B Surface Antigen    Quantiferon Gold TB    Severe needle phobia    Relevant Medications    upadacitinib (RINVOQ) 15 mg 24 hr tablet     Other Visit Diagnoses       Acquired iron deficiency anemia due to decreased absorption        Relevant Medications    ferrous sulfate 324 mg (65 mg iron) TbEC    Other Relevant Orders    FERRITIN    IRON AND TIBC    Drug-induced immunodeficiency        Relevant Orders    Hepatitis B Core Antibody, Total    Hepatitis B Surface Ab, Qualitative    Hepatitis B Surface Antigen    Quantiferon Gold TB    Encounter for medication monitoring        Relevant Orders    Hepatitis B Core Antibody, Total    Hepatitis B Surface Ab, Qualitative    Hepatitis B Surface Antigen    Quantiferon Gold TB            Labs reviewed today:-   Latest Reference Range & Units Most Recent   TARIQ Screen Negative <1:80  Negative <1:80  7/11/22 11:51   CCP Antibodies <5.0 U/mL 98.8 (H)  7/21/22 09:37   Rheumatoid Factor 0.0 - 15.0 IU/mL 340.0 (H)  7/11/22 11:51   (H): Data is abnormally high     Latest Reference Range & Units 05/10/24 16:25   WBC 3.90 - 12.70 K/uL 5.67   RBC 4.60 - 6.20 M/uL 4.39 (L)   Hemoglobin 14.0 - 18.0 g/dL 13.7 (L)   Hematocrit 40.0 - 54.0 % 40.7   MCV 82 - 98 fL 93   MCH 27.0 - 31.0 pg 31.2 (H)   MCHC 32.0 - 36.0 g/dL 33.7   RDW 11.5 - 14.5 % 12.7   Platelet Count 150 - 450 K/uL 233   MPV 9.2 - 12.9 fL 10.1   Gran % 38.0 - 73.0 % 55.4   Lymph % 18.0 - 48.0 % 33.9   Mono % 4.0 - 15.0 % 8.5   Eos % 0.0 - 8.0 % 0.9   Basophil % 0.0 - 1.9 % 0.9   Immature Granulocytes 0.0 - 0.5 % 0.4   Gran # (ANC) 1.8 - 7.7 K/uL 3.2   Lymph # 1.0 - 4.8 K/uL 1.9   Mono # 0.3 - 1.0 K/uL 0.5   Eos # 0.0 - 0.5 K/uL 0.1   Baso # 0.00 - 0.20 K/uL 0.05   Immature  Grans (Abs) 0.00 - 0.04 K/uL 0.02   nRBC 0 /100 WBC 0   Differential Method  Automated   Sodium 136 - 145 mmol/L 138   Potassium 3.5 - 5.1 mmol/L 4.0   Chloride 95 - 110 mmol/L 106   CO2 23 - 29 mmol/L 23   Anion Gap 8 - 16 mmol/L 9   BUN 6 - 20 mg/dL 20   Creatinine 0.5 - 1.4 mg/dL 1.2   eGFR >60 mL/min/1.73 m^2 >60   Glucose 70 - 110 mg/dL 86   Calcium 8.7 - 10.5 mg/dL 9.7   ALP 55 - 135 U/L 51 (L)   PROTEIN TOTAL 6.0 - 8.4 g/dL 7.4   Albumin 3.5 - 5.2 g/dL 4.3   BILIRUBIN TOTAL 0.1 - 1.0 mg/dL 0.9   AST 10 - 40 U/L 21   ALT 10 - 44 U/L 9 (L)   (L): Data is abnormally low  (H): Data is abnormally high        Severe flare of seropositive nonerosive rheumatoid arthritis.  Severe intolerance to methotrexate.  Failing sulfasalazine and Plaquenil.  Takes prednisone p.r.n. for extreme pain.  Discontinue Plaquenil.  Ideally I would start TNF alpha inhibitor therapy.  But he has extreme needle phobia which restricts use of injectable and IV infusions.  Try Rinvoq.  Continue sulfasalazine until Rinvoq proves efficacious.  Try Medrol Dosepak for severe flare.  After completion of Medrol Dosepak start prednisone 5 mg daily.  Severe chronic fatigue remains unexplained.  Positive snoring and unrefreshed sleep with significant daytime somnolence since raises possibility of sleep apnea.  Consult sleep clinic for further evaluation.  Chronic anemia.  Could be related to anemia of chronic inflammation due to uncontrolled rheumatoid.  Check iron panel.  May improve with adequate control of rheumatoid arthritis.  If no significant improvement noted after controlling rheumatoid disease activity, consult hematologist.  I agree that there might be component of chronic uncontrolled inflammation with active rheumatoid worsening his fatigue.  This will only be confirmed after we adequately control his rheumatoid arthritis.  I do not recommend use of CNS stimulants.  I have explained all of the above in detail and the patient understands  all of the current recommendation(s). I have answered all questions to the best of my ability and to their complete satisfaction.     Total time spent 40 minutes including time needed to review the records, the   patient evaluation, documentation, face-to-face discussion with the patient,   coordination of care and counseling.    Level V visit.  # Follow up in about 3 weeks (around 6/13/2024).      Disclaimer: This note was prepared using voice recognition system and is likely to have sound alike errors and is not proof read.  Please call me with any questions.

## 2024-05-23 NOTE — PATIENT INSTRUCTIONS
Start medrol dose pack  Start daily prednisone after completion of the steroid pack.   Start Rinvoq one tablet once daily   Stay on SSZ UNTIL I see you next time.

## 2024-05-23 NOTE — Clinical Note
Laura, Please get Rinvoq approved for him. Cannot try injectables due to severe needle phobia. Thanks.

## 2024-05-23 NOTE — TELEPHONE ENCOUNTER
----- Message from Sen Tubbs MD sent at 5/23/2024  9:47 AM CDT -----  Laura, Please get Rinvoq approved for him. Cannot try injectables due to severe needle phobia. Thanks.

## 2024-05-24 LAB
GAMMA INTERFERON BACKGROUND BLD IA-ACNC: 0.01 IU/ML
M TB IFN-G CD4+ BCKGRND COR BLD-ACNC: 0.04 IU/ML
M TB IFN-G CD4+ BCKGRND COR BLD-ACNC: 0.06 IU/ML
MITOGEN IGNF BCKGRD COR BLD-ACNC: 9.99 IU/ML
TB GOLD PLUS: NEGATIVE

## 2024-06-19 ENCOUNTER — TELEPHONE (OUTPATIENT)
Dept: RHEUMATOLOGY | Facility: CLINIC | Age: 29
End: 2024-06-19

## 2024-06-19 ENCOUNTER — OFFICE VISIT (OUTPATIENT)
Dept: RHEUMATOLOGY | Facility: CLINIC | Age: 29
End: 2024-06-19
Payer: COMMERCIAL

## 2024-06-19 ENCOUNTER — LAB VISIT (OUTPATIENT)
Dept: LAB | Facility: HOSPITAL | Age: 29
End: 2024-06-19
Attending: INTERNAL MEDICINE
Payer: COMMERCIAL

## 2024-06-19 VITALS
HEIGHT: 69 IN | DIASTOLIC BLOOD PRESSURE: 88 MMHG | HEART RATE: 75 BPM | WEIGHT: 163.56 LBS | SYSTOLIC BLOOD PRESSURE: 139 MMHG | BODY MASS INDEX: 24.23 KG/M2

## 2024-06-19 DIAGNOSIS — D50.8 ACQUIRED IRON DEFICIENCY ANEMIA DUE TO DECREASED ABSORPTION: Primary | ICD-10-CM

## 2024-06-19 DIAGNOSIS — Z51.81 ENCOUNTER FOR MEDICATION MONITORING: ICD-10-CM

## 2024-06-19 DIAGNOSIS — R53.82 CHRONIC FATIGUE: ICD-10-CM

## 2024-06-19 DIAGNOSIS — D84.821 DRUG-INDUCED IMMUNODEFICIENCY: ICD-10-CM

## 2024-06-19 DIAGNOSIS — M05.79 RHEUMATOID ARTHRITIS INVOLVING MULTIPLE SITES WITH POSITIVE RHEUMATOID FACTOR: Primary | ICD-10-CM

## 2024-06-19 DIAGNOSIS — D50.8 ACQUIRED IRON DEFICIENCY ANEMIA DUE TO DECREASED ABSORPTION: ICD-10-CM

## 2024-06-19 DIAGNOSIS — Z79.899 DRUG-INDUCED IMMUNODEFICIENCY: ICD-10-CM

## 2024-06-19 LAB
HGB BLD-MCNC: 13.9 G/DL (ref 14–18)
IRON SERPL-MCNC: 129 UG/DL (ref 45–160)
SATURATED IRON: 32 % (ref 20–50)
TOTAL IRON BINDING CAPACITY: 403 UG/DL (ref 250–450)
TRANSFERRIN SERPL-MCNC: 272 MG/DL (ref 200–375)

## 2024-06-19 PROCEDURE — 85018 HEMOGLOBIN: CPT | Performed by: INTERNAL MEDICINE

## 2024-06-19 PROCEDURE — 1160F RVW MEDS BY RX/DR IN RCRD: CPT | Mod: CPTII,S$GLB,, | Performed by: INTERNAL MEDICINE

## 2024-06-19 PROCEDURE — 1159F MED LIST DOCD IN RCRD: CPT | Mod: CPTII,S$GLB,, | Performed by: INTERNAL MEDICINE

## 2024-06-19 PROCEDURE — 3008F BODY MASS INDEX DOCD: CPT | Mod: CPTII,S$GLB,, | Performed by: INTERNAL MEDICINE

## 2024-06-19 PROCEDURE — 99214 OFFICE O/P EST MOD 30 MIN: CPT | Mod: S$GLB,,, | Performed by: INTERNAL MEDICINE

## 2024-06-19 PROCEDURE — 36415 COLL VENOUS BLD VENIPUNCTURE: CPT | Performed by: INTERNAL MEDICINE

## 2024-06-19 PROCEDURE — 83540 ASSAY OF IRON: CPT | Performed by: INTERNAL MEDICINE

## 2024-06-19 PROCEDURE — 3079F DIAST BP 80-89 MM HG: CPT | Mod: CPTII,S$GLB,, | Performed by: INTERNAL MEDICINE

## 2024-06-19 PROCEDURE — 3075F SYST BP GE 130 - 139MM HG: CPT | Mod: CPTII,S$GLB,, | Performed by: INTERNAL MEDICINE

## 2024-06-19 PROCEDURE — 99999 PR PBB SHADOW E&M-EST. PATIENT-LVL IV: CPT | Mod: PBBFAC,,, | Performed by: INTERNAL MEDICINE

## 2024-06-19 NOTE — PROGRESS NOTES
RHEUMATOLOGY CLINIC FOLLOW UP VISIT  Chief complaints, HPI, ROS, EXAM, Assessment & Plans:-  Be Bhatia a 29 y.o. pleasant male comes in for follow-up visit.  Seropositive nonerosive rheumatoid arthritis started on Rinvoq 4 weeks ago for active disease.  Significant improvement of joint pain and stiffness over bilateral MCP joints.  Better fist formation in the last couple of weeks.  No change in his fatigue.  No worsening symptoms.  On iron supplement.  Have not heard from the sleep Clinic..    Rheumatological review of system negative otherwise.  Physical examination shows significant improvement of synovitis of multiple MCP and PIP joints.  No active synovitis today, no effusion of ankles.  Positive MTP squeeze test.  1. Rheumatoid arthritis involving multiple sites with positive rheumatoid factor    2. Drug-induced immunodeficiency    3. Encounter for medication monitoring    4. Chronic fatigue      Problem List Items Addressed This Visit       Chronic fatigue    Rheumatoid arthritis involving multiple sites with positive rheumatoid factor - Primary     Other Visit Diagnoses       Drug-induced immunodeficiency        Encounter for medication monitoring                Labs reviewed today:-   Latest Reference Range & Units Most Recent   TARIQ Screen Negative <1:80  Negative <1:80  7/11/22 11:51   CCP Antibodies <5.0 U/mL 98.8 (H)  7/21/22 09:37   Rheumatoid Factor 0.0 - 15.0 IU/mL 340.0 (H)  7/11/22 11:51   (H): Data is abnormally high     Latest Reference Range & Units 05/10/24 16:25   WBC 3.90 - 12.70 K/uL 5.67   RBC 4.60 - 6.20 M/uL 4.39 (L)   Hemoglobin 14.0 - 18.0 g/dL 13.7 (L)   Hematocrit 40.0 - 54.0 % 40.7   MCV 82 - 98 fL 93   MCH 27.0 - 31.0 pg 31.2 (H)   MCHC 32.0 - 36.0 g/dL 33.7   RDW 11.5 - 14.5 % 12.7   Platelet Count 150 - 450 K/uL 233   MPV 9.2 - 12.9 fL 10.1   Gran % 38.0 - 73.0 % 55.4   Lymph % 18.0 - 48.0 % 33.9   Mono % 4.0 - 15.0 % 8.5    Eos % 0.0 - 8.0 % 0.9   Basophil % 0.0 - 1.9 % 0.9   Immature Granulocytes 0.0 - 0.5 % 0.4   Gran # (ANC) 1.8 - 7.7 K/uL 3.2   Lymph # 1.0 - 4.8 K/uL 1.9   Mono # 0.3 - 1.0 K/uL 0.5   Eos # 0.0 - 0.5 K/uL 0.1   Baso # 0.00 - 0.20 K/uL 0.05   Immature Grans (Abs) 0.00 - 0.04 K/uL 0.02   nRBC 0 /100 WBC 0   Differential Method  Automated   Sodium 136 - 145 mmol/L 138   Potassium 3.5 - 5.1 mmol/L 4.0   Chloride 95 - 110 mmol/L 106   CO2 23 - 29 mmol/L 23   Anion Gap 8 - 16 mmol/L 9   BUN 6 - 20 mg/dL 20   Creatinine 0.5 - 1.4 mg/dL 1.2   eGFR >60 mL/min/1.73 m^2 >60   Glucose 70 - 110 mg/dL 86   Calcium 8.7 - 10.5 mg/dL 9.7   ALP 55 - 135 U/L 51 (L)   PROTEIN TOTAL 6.0 - 8.4 g/dL 7.4   Albumin 3.5 - 5.2 g/dL 4.3   BILIRUBIN TOTAL 0.1 - 1.0 mg/dL 0.9   AST 10 - 40 U/L 21   ALT 10 - 44 U/L 9 (L)   (L): Data is abnormally low  (H): Data is abnormally high        Significant improvement of seropositive nonerosive rheumatoid since starting Rinvoq.  Severe needle phobia contraindicated use of injectable options.  Synovitis have resolved.  But no improvement of fatigue so far.  On iron supplements since last visit.  Check iron levels today.  Contact sleep clinic to check on the status of the referral.   I do not recommend use of CNS stimulants for chronic fatigue.  I have explained all of the above in detail and the patient understands all of the current recommendation(s). I have answered all questions to the best of my ability and to their complete satisfaction.       # Follow up in about 3 months (around 9/19/2024).      Disclaimer: This note was prepared using voice recognition system and is likely to have sound alike errors and is not proof read.  Please call me with any questions.

## 2024-06-19 NOTE — TELEPHONE ENCOUNTER
----- Message from Sen Tubbs MD sent at 6/19/2024  9:32 AM CDT -----  Thanks.  Please let the patient know to call [facility] help desk to get in touch with sleep clinic if he does not receive phone call by Monday.  Thanks  ----- Message -----  From: Gina Ramirez MA  Sent: 6/19/2024   9:25 AM CDT  To: Sen Tubbs MD      ----- Message -----  From: Greg Flores LPN  Sent: 6/19/2024   9:14 AM CDT  To: Gina Ramirez MA    We have a work queue. Pt' will be called once we reach their referral in the queue.  ----- Message -----  From: Gina Ramirez MA  Sent: 6/19/2024   9:10 AM CDT  To: [facility] Clinical Support; #    A referral was placed on May 23.  Patient states that no one has contacted him to schedule. Can you please contact patient.    -MARIANO Fuentes

## 2024-07-01 ENCOUNTER — OFFICE VISIT (OUTPATIENT)
Dept: PULMONOLOGY | Facility: CLINIC | Age: 29
End: 2024-07-01
Payer: COMMERCIAL

## 2024-07-01 VITALS
WEIGHT: 162.94 LBS | DIASTOLIC BLOOD PRESSURE: 80 MMHG | HEIGHT: 69 IN | BODY MASS INDEX: 24.13 KG/M2 | OXYGEN SATURATION: 98 % | SYSTOLIC BLOOD PRESSURE: 120 MMHG | RESPIRATION RATE: 17 BRPM | HEART RATE: 88 BPM

## 2024-07-01 DIAGNOSIS — G47.33 OSA (OBSTRUCTIVE SLEEP APNEA): ICD-10-CM

## 2024-07-01 DIAGNOSIS — R53.82 CHRONIC FATIGUE: Primary | ICD-10-CM

## 2024-07-01 DIAGNOSIS — M05.79 RHEUMATOID ARTHRITIS INVOLVING MULTIPLE SITES WITH POSITIVE RHEUMATOID FACTOR: ICD-10-CM

## 2024-07-01 PROCEDURE — 3008F BODY MASS INDEX DOCD: CPT | Mod: CPTII,S$GLB,, | Performed by: INTERNAL MEDICINE

## 2024-07-01 PROCEDURE — 99204 OFFICE O/P NEW MOD 45 MIN: CPT | Mod: S$GLB,,, | Performed by: INTERNAL MEDICINE

## 2024-07-01 PROCEDURE — 3074F SYST BP LT 130 MM HG: CPT | Mod: CPTII,S$GLB,, | Performed by: INTERNAL MEDICINE

## 2024-07-01 PROCEDURE — 1160F RVW MEDS BY RX/DR IN RCRD: CPT | Mod: CPTII,S$GLB,, | Performed by: INTERNAL MEDICINE

## 2024-07-01 PROCEDURE — 99999 PR PBB SHADOW E&M-EST. PATIENT-LVL V: CPT | Mod: PBBFAC,,, | Performed by: INTERNAL MEDICINE

## 2024-07-01 PROCEDURE — 3079F DIAST BP 80-89 MM HG: CPT | Mod: CPTII,S$GLB,, | Performed by: INTERNAL MEDICINE

## 2024-07-01 PROCEDURE — 1159F MED LIST DOCD IN RCRD: CPT | Mod: CPTII,S$GLB,, | Performed by: INTERNAL MEDICINE

## 2024-07-01 NOTE — PROGRESS NOTES
Pulmonary Outpatient  Visit     Subjective:       Patient ID: Be Brito is a 29 y.o. male.    Social History     Tobacco Use   Smoking Status Every Day    Current packs/day: 1.00    Types: Cigarettes   Smokeless Tobacco Current            Chief Complaint: Fatigue and Sleep Apnea          Be Brito is 29 y.o.  Referred by Sen Tubbs MD  [address] [facility] [place] [address]   Concern about chronic fatigue  Duration of symptoms: 10 years  RA on RINVOQ, chr prednisone, and Sulfasalazine  Has snoiring  Easily falls asleep  Takes charis  Grind teeth  Bed time 10-11 pm  Takes NO sleeping pills  Talking in sleep   Vivid dreams  Wake time 7 am    Works as  [employer] off [employer]  Denies any sleep attacks or restless legs   Denies any signs related to narcolepsy   Sometimes have violent movement during sleep   Grind teeth at night   Noteworthy stress worry too much in general  Endorses having snoring, wake up with dry mouth, sore testing the mouth, difficulty falling asleep difficulty staying asleep worry and find it hard to relax.  Wake up frequently during the night                 Review of Systems   Constitutional:  Positive for fatigue.   Respiratory:  Positive for apnea, snoring and somnolence.    Psychiatric/Behavioral:  Positive for sleep disturbance.        Outpatient Encounter Medications as of 7/1/2024   Medication Sig Dispense Refill    acetaminophen (TYLENOL ORAL) Take by mouth.      aluminum chloride (DRYSOL) 20 % external solution Apply topically every evening. 35 mL 0    azelastine-fluticasone (DYMISTA) 137-50 mcg/spray Spry nassal spray 2 sprays by Each Nostril route once daily. 23 g 5    ciclopirox 0.77 % Gel Apply topically 2 (two) times daily. In between toe webspaces 30 g 1    erythromycin with ethanoL (ERYGEL) 2 % gel Apply topically once daily. For 14 days to bottom of feet. 30 g 2    ferrous sulfate 324 mg (65 mg iron) TbEC  Take 1 tablet (324 mg total) by mouth once daily. 90 tablet 3    methylPREDNISolone (MEDROL DOSEPACK) 4 mg tablet use as directed 21 tablet 0    naproxen (NAPROSYN) 500 MG tablet Take 1 tablet (500 mg total) by mouth 2 (two) times daily with meals. 60 tablet 1    predniSONE (DELTASONE) 5 MG tablet Take 1 tablet (5 mg total) by mouth once daily. 90 tablet 1    sulfaSALAzine (AZULFIDINE) 500 MG EC tablet Take 1 tablet (500 mg total) by mouth 2 (two) times a day. 180 tablet 1    upadacitinib (RINVOQ) 15 mg 24 hr tablet Take 1 tablet (15 mg total) by mouth once daily. 30 tablet 11     No facility-administered encounter medications on file as of 7/1/2024.       The following portions of the patient's history were reviewed and updated as appropriate: He  has no past medical history on file.  He does not have any pertinent problems on file.  He  has a past surgical history that includes Tonsillectomy and trigger finger injection.  His family history includes Arthritis in his father; Leukemia in his father; Lung cancer in his mother; Osteoporosis in his father.  He  reports that he has been smoking cigarettes. He uses smokeless tobacco. He reports current alcohol use of about 25.0 standard drinks of alcohol per week. He reports that he does not use drugs.  He has a current medication list which includes the following prescription(s): acetaminophen, aluminum chloride, azelastine-fluticasone, ciclopirox, erythromycin with ethanol, ferrous sulfate, methylprednisolone, naproxen, prednisone, sulfasalazine, and rinvoq.  Current Outpatient Medications on File Prior to Visit   Medication Sig Dispense Refill    acetaminophen (TYLENOL ORAL) Take by mouth.      aluminum chloride (DRYSOL) 20 % external solution Apply topically every evening. 35 mL 0    azelastine-fluticasone (DYMISTA) 137-50 mcg/spray Spry nassal spray 2 sprays by Each Nostril route once daily. 23 g 5    ciclopirox 0.77 % Gel Apply topically 2 (two) times daily. In  between toe webspaces 30 g 1    erythromycin with ethanoL (ERYGEL) 2 % gel Apply topically once daily. For 14 days to bottom of feet. 30 g 2    ferrous sulfate 324 mg (65 mg iron) TbEC Take 1 tablet (324 mg total) by mouth once daily. 90 tablet 3    methylPREDNISolone (MEDROL DOSEPACK) 4 mg tablet use as directed 21 tablet 0    naproxen (NAPROSYN) 500 MG tablet Take 1 tablet (500 mg total) by mouth 2 (two) times daily with meals. 60 tablet 1    predniSONE (DELTASONE) 5 MG tablet Take 1 tablet (5 mg total) by mouth once daily. 90 tablet 1    sulfaSALAzine (AZULFIDINE) 500 MG EC tablet Take 1 tablet (500 mg total) by mouth 2 (two) times a day. 180 tablet 1    upadacitinib (RINVOQ) 15 mg 24 hr tablet Take 1 tablet (15 mg total) by mouth once daily. 30 tablet 11     No current facility-administered medications on file prior to visit.     He has No Known Allergies..      BP Readings from Last 3 Encounters:   07/01/24 120/80   06/19/24 139/88   05/23/24 (!) 130/90     Snoring / Sleep:     Eleanor Questionnaire (validated BAUTISTA screening questionnaire)    YES -- Snoring/apnea    YES -- Fatigue    Body mass index is 24.06 kg/m².  (>25 is overweight, >30 is obese)    Blood Pressure = normal blood pressure  (PreHTN 120-139/80-89, Stg1 140-159/90-99, Stg2 >160/>100)  Eleanor = 2 of three BAUTISTA categories are positive (high risk is 2-3 positive categories)     [place] Sleepiness Scale TOTAL =        7/1/2024     1:48 PM   EPWORTH SLEEPINESS SCALE   Sitting and reading 2   Watching TV 2   Sitting, inactive in a public place (e.g. a theatre or a meeting) 1   As a passenger in a car for an hour without a break 1   Lying down to rest in the afternoon when circumstances permit 3   Sitting and talking to someone 0   Sitting quietly after a lunch without alcohol 0   In a car, while stopped for a few minutes in traffic 0   Total score 9      (validated sleepiness questionnaire with a higher score indicating greater sleepiness; range  "0-24)  Failed to redirect to the Timeline version of the [employer] SmartLink.    STOP-Bang Questionnaire (validated BAUTISTA screening questionnaire)  Negative unless checked off.  [x] Snoring    [x]  Tired/Fatigued/Sleepy  [] Obstruction (apneas/choking)  [] Pressure (HTN)  [] BMI >35  [] Age >50  [] Neck >40 cm  [x] Gender male   STOP-Bang = 3 (low risk 0-2,high risk 3-8)    Neck circumference 16"    MMRC Dyspnea Scale (4 is worst)     [] MMRC 0: Dyspneic on strenuous excercise (0 points)    [] MMRC 1: Dyspneic on walking a slight hill (0 points)    [] MMRC 2: Dyspneic on walking level ground; must stop occasionally due to breathlessness (1 point)    [] MMRC 3: Must stop for breathlessness after walking 100 yards or after a few minutes (2 points)    [] MMRC 4: Cannot leave house; breathless on dressing/undressing (3 points)                          No data to display                          Objective:     Vital Signs (Most Recent)  Vital Signs  Pulse: 88  Resp: 17  SpO2: 98 %  BP: 120/80  Height and Weight  Height: 5' 9" (175.3 cm)  Weight: 73.9 kg (162 lb 14.7 oz)  BSA (Calculated - sq m): 1.9 sq meters  BMI (Calculated): 24  Weight in (lb) to have BMI = 25: 168.9]  Wt Readings from Last 2 Encounters:   07/01/24 73.9 kg (162 lb 14.7 oz)   06/19/24 74.2 kg (163 lb 9.3 oz)       Physical Exam  Vitals and nursing note reviewed.   Constitutional:       Appearance: Normal appearance.   HENT:      Head: Normocephalic and atraumatic.      Right Ear: Tympanic membrane normal.      Nose: Nose normal.   Eyes:      Pupils: Pupils are equal, round, and reactive to light.   Cardiovascular:      Rate and Rhythm: Normal rate and regular rhythm.      Pulses: Normal pulses.      Heart sounds: Normal heart sounds.   Pulmonary:      Effort: Pulmonary effort is normal.      Breath sounds: Normal breath sounds.   Abdominal:      General: Bowel sounds are normal.      Palpations: Abdomen is soft.   Musculoskeletal:         General: Normal " "range of motion.      Cervical back: Normal range of motion.   Skin:     General: Skin is warm and dry.      Capillary Refill: Capillary refill takes less than 2 seconds.   Neurological:      General: No focal deficit present.      Mental Status: He is alert and oriented to person, place, and time.   Psychiatric:         Mood and Affect: Mood normal.          Laboratory  Lab Results   Component Value Date    WBC 5.67 05/10/2024    RBC 4.39 (L) 05/10/2024    HGB 13.9 (L) 06/19/2024    HCT 40.7 05/10/2024    MCV 93 05/10/2024    MCH 31.2 (H) 05/10/2024    MCHC 33.7 05/10/2024    RDW 12.7 05/10/2024     05/10/2024    MPV 10.1 05/10/2024    GRAN 3.2 05/10/2024    GRAN 55.4 05/10/2024    LYMPH 1.9 05/10/2024    LYMPH 33.9 05/10/2024    MONO 0.5 05/10/2024    MONO 8.5 05/10/2024    EOS 0.1 05/10/2024    BASO 0.05 05/10/2024    EOSINOPHIL 0.9 05/10/2024    BASOPHIL 0.9 05/10/2024       BMP  Lab Results   Component Value Date     05/10/2024    K 4.0 05/10/2024     05/10/2024    CO2 23 05/10/2024    BUN 20 05/10/2024    CREATININE 1.2 05/10/2024    CALCIUM 9.7 05/10/2024    ANIONGAP 9 05/10/2024    ESTGFRAFRICA >60.0 07/11/2022    EGFRNONAA >60.0 07/11/2022    AST 21 05/10/2024    ALT 9 (L) 05/10/2024    PROT 7.4 05/10/2024          No results found for: "IGE"     No results found for: "ASPERGILLUS"  No results found for: "AFUMIGATUSCL"     No results found for: "ACE"     Diagnostic Results:  I have personally reviewed today the following studies:    X-Ray Chest PA And Lateral  Narrative: EXAM: XR CHEST PA AND LATERAL    CLINICAL HISTORY:  Rheumatoid arthritis.    TECHNIQUE: 2 view chest x-ray.    FINDINGS: The heart size is normal. The lung fields are clear.  Impression:  No acute findings.    Finalized on: 2/23/2024 11:56 AM By:  Waldo Jarrell MD  [facility]# [id number]      2024-02-23 11:58:[id number]    [facility]       Assessment/Plan:     Problem List Items Addressed This Visit       Rheumatoid arthritis involving " multiple sites with positive rheumatoid factor    Relevant Orders    Polysomnogram (CPAP will be added if patient meets diagnostic criteria.)    Chronic fatigue - Primary    Relevant Orders    Polysomnogram (CPAP will be added if patient meets diagnostic criteria.)    BAUTISTA (obstructive sleep apnea)    Relevant Orders    Polysomnogram (CPAP will be added if patient meets diagnostic criteria.)        My recommendation at this point would be to set up a   INLAB POLYSOMNOGRAPHY  through [facility] ( [phone].    We have discussed weight loss , non supine position, good sleep hygiene, and  other interventions to foster good natural healthy sleep.  We have discussed behavioral modifications, as well.  Afterhis study, he  could certainly try PAP therapy or out suitable alternatives      Follow up in about 4 weeks (around 7/29/2024), or SDI, Sleep diary, sleep study.    This note was prepared using voice recognition system and is likely to have sound alike errors that may have been overlooked even after proof reading.  Please call me with any questions    Discussed diagnosis, its evaluation, treatment and usual course. All questions answered.    Thank you for the courtesy of participating in the care of this patient    Kem Sears MD      Personal Diagnostic Review  []  CXR    []  ECHO    []  ONSAT    []  6MWD    []  LABS    []  CHEST CT    []  PET CT    []  Biopsy results

## 2024-07-16 ENCOUNTER — TELEPHONE (OUTPATIENT)
Dept: SLEEP MEDICINE | Facility: CLINIC | Age: 29
End: 2024-07-16
Payer: COMMERCIAL

## 2024-07-16 DIAGNOSIS — G47.33 OSA (OBSTRUCTIVE SLEEP APNEA): Primary | ICD-10-CM

## 2024-07-16 NOTE — TELEPHONE ENCOUNTER
Orders Placed This Encounter   Procedures    Home Sleep Study     Standing Status:   Future     Standing Expiration Date:   7/16/2025

## 2024-07-22 ENCOUNTER — TELEPHONE (OUTPATIENT)
Dept: PULMONOLOGY | Facility: CLINIC | Age: 29
End: 2024-07-22
Payer: COMMERCIAL

## 2024-07-22 NOTE — TELEPHONE ENCOUNTER
Spoke with pt. Pt informed of appt time change on 07/30/24. Pt was originally scheduled for 1:40 pm, time changed to 1:00pm. Pt acknowledged understanding and was thankful for the call.

## 2024-08-13 ENCOUNTER — HOSPITAL ENCOUNTER (OUTPATIENT)
Dept: SLEEP MEDICINE | Facility: HOSPITAL | Age: 29
Discharge: HOME | End: 2024-08-13
Attending: INTERNAL MEDICINE
Payer: COMMERCIAL

## 2024-08-13 DIAGNOSIS — G47.33 OSA (OBSTRUCTIVE SLEEP APNEA): ICD-10-CM

## 2024-08-13 PROCEDURE — 95806 SLEEP STUDY UNATT&RESP EFFT: CPT | Performed by: INTERNAL MEDICINE

## 2024-08-13 NOTE — PROCEDURES
PHYSICIAN INTERPRETATION AND COMMENTS: Findings are consistent with moderate, positional obstructive sleep  apnea(BAUTISTA).  CLINICAL HISTORY: 29 year old male presented with: 16.5 inch neck, BMI of 23.6, an Slaughters sleepiness score of 10, no comorbidities  and symptoms of nocturnal snoring, waking up choking and witnessed apneas. Based on the clinical history,  the patient has a high pre-test probability of having Moderate BAUTISTA.  SLEEP STUDY FINDINGS: Patient underwent a 1 night Home Sleep Test and by behavioral criteria, slept for approximately  6.73 hours, with a sleep latency of 6 minutes and a sleep efficiency of 98%. Mild sleep disordered breathing (AHI=7) is  noted based on a 4% hypopnea desaturation criteria, predominantly in the supine position (9 events/hour). The patient  slept supine 75.4% of the night based on valid recording time of 6.78 hours and is 9 times as likely to have  apneas/hypopneas when supine. When considering more subtle measures of sleep disordered breathing, the overall  respiratory disturbance index is moderate(RDI=21) based on a 1% hypopnea desaturation criteria with confirmation by  surrogate arousal indicators. The apneas/hypopneas are accompanied by minimal oxygen desaturation (percent time  below 90% SpO2: 0%, Min SpO2: 91.7%). The average desaturation across all sleep disordered breathing events is 1.6%.  Snoring occurs for 15.5% (30 dB) of the study. The mean pulse rate is 68.8 BPM, with very frequent pulse rate variability (93  events with >= 6 BPM increase/decrease per hour).  TREATMENT CONSIDERATIONS: Consider nasal continuous positive airway pressure (CPAP/AutoPAP) as the initial  treatment choice based on the RDI severity. A mandibular advancement splint (MAS) or referral to an ENT surgeon for  modification to the airway should be considered to reduce the potential risk of hypertension, cardiovascular disease,  stroke and diabetes if the CPAP trial is unsuccessful or the patient  "prefers an alternative therapy. Based on the BAUTISTA Supine  data in the study, a Mandibular Advancement Splint (MAS) will likely provide treatment benefit independent of BAUTISTA  severity. The patient should avoid sleeping supine given non-supine AHI is in the normal range.  DISEASE MANAGEMENT CONSIDERATIONS: None.    Dear Kem Sears MD  [id number] [facility]  [place] [id number]/Lawanda Martinez MD         The sleep study that you ordered is complete.  You have ordered sleep LAB services to perform the sleep study for Be Brito .      Please find Sleep Study result in  the "Media tab" of Chart Review menu.        You can look  for the report in the  Media by the document type "Sleep Study Documents". Alphabetizing  "Document type" column helps to find the SLEEP STUDY report  Faster.       As the ordering provider, you are responsible for reviewing the results and implementing a treatment plan with your patient.    If you need a Sleep Medicine provider to explain the sleep study findings and arrange treatment for the patient, please refer patient for consultation to our Sleep Clinic via [facility] with Ambulatory Consult Sleep.     To do that please place an order for an  "Ambulatory Consult Sleep" -  order , it will go to our clinic work queue for our staff  to contact the patient for an appointment.      For any questions, please contact our sleep lab  staff at [phone] to talk to clinical staff          Kem Sears MD    "

## 2024-09-10 ENCOUNTER — TELEPHONE (OUTPATIENT)
Dept: PULMONOLOGY | Facility: CLINIC | Age: 29
End: 2024-09-10
Payer: COMMERCIAL

## 2024-09-10 NOTE — TELEPHONE ENCOUNTER
----- Message from Delores Connell sent at 9/10/2024  3:33 PM CDT -----  Type:  Sooner Apoointment Request    Caller is requesting a sooner appointment.  Caller declined first available appointment listed below.  Caller will not accept being placed on the waitlist and is requesting a message be sent to doctor.  Name of Caller:pt  Would the patient rather a call back or a response via [facility]? call  Best Call Back Number: [phone]

## 2024-09-16 ENCOUNTER — LAB VISIT (OUTPATIENT)
Dept: LAB | Facility: HOSPITAL | Age: 29
End: 2024-09-16
Attending: INTERNAL MEDICINE
Payer: COMMERCIAL

## 2024-09-16 ENCOUNTER — OFFICE VISIT (OUTPATIENT)
Dept: PULMONOLOGY | Facility: CLINIC | Age: 29
End: 2024-09-16
Payer: COMMERCIAL

## 2024-09-16 VITALS
HEIGHT: 69 IN | SYSTOLIC BLOOD PRESSURE: 120 MMHG | HEART RATE: 60 BPM | WEIGHT: 160 LBS | DIASTOLIC BLOOD PRESSURE: 68 MMHG | RESPIRATION RATE: 18 BRPM | OXYGEN SATURATION: 96 % | BODY MASS INDEX: 23.7 KG/M2

## 2024-09-16 DIAGNOSIS — Z79.899 DRUG-INDUCED IMMUNODEFICIENCY: ICD-10-CM

## 2024-09-16 DIAGNOSIS — D50.8 ACQUIRED IRON DEFICIENCY ANEMIA DUE TO DECREASED ABSORPTION: ICD-10-CM

## 2024-09-16 DIAGNOSIS — Z51.81 ENCOUNTER FOR MEDICATION MONITORING: ICD-10-CM

## 2024-09-16 DIAGNOSIS — M05.79 RHEUMATOID ARTHRITIS INVOLVING MULTIPLE SITES WITH POSITIVE RHEUMATOID FACTOR: ICD-10-CM

## 2024-09-16 DIAGNOSIS — D84.821 DRUG-INDUCED IMMUNODEFICIENCY: ICD-10-CM

## 2024-09-16 DIAGNOSIS — G47.33 OSA (OBSTRUCTIVE SLEEP APNEA): Primary | ICD-10-CM

## 2024-09-16 LAB
ALBUMIN SERPL BCP-MCNC: 4.3 G/DL (ref 3.5–5.2)
ALP SERPL-CCNC: 38 U/L (ref 55–135)
ALT SERPL W/O P-5'-P-CCNC: 16 U/L (ref 10–44)
ANION GAP SERPL CALC-SCNC: 11 MMOL/L (ref 8–16)
AST SERPL-CCNC: 22 U/L (ref 10–40)
BASOPHILS # BLD AUTO: 0.04 K/UL (ref 0–0.2)
BASOPHILS NFR BLD: 0.8 % (ref 0–1.9)
BILIRUB SERPL-MCNC: 1.1 MG/DL (ref 0.1–1)
BUN SERPL-MCNC: 10 MG/DL (ref 6–20)
CALCIUM SERPL-MCNC: 9.8 MG/DL (ref 8.7–10.5)
CHLORIDE SERPL-SCNC: 103 MMOL/L (ref 95–110)
CO2 SERPL-SCNC: 24 MMOL/L (ref 23–29)
CREAT SERPL-MCNC: 1 MG/DL (ref 0.5–1.4)
CRP SERPL-MCNC: 1.5 MG/L (ref 0–8.2)
DIFFERENTIAL METHOD BLD: ABNORMAL
EOSINOPHIL # BLD AUTO: 0 K/UL (ref 0–0.5)
EOSINOPHIL NFR BLD: 0.6 % (ref 0–8)
ERYTHROCYTE [DISTWIDTH] IN BLOOD BY AUTOMATED COUNT: 12.1 % (ref 11.5–14.5)
ERYTHROCYTE [SEDIMENTATION RATE] IN BLOOD BY WESTERGREN METHOD: 5 MM/HR (ref 0–10)
EST. GFR  (NO RACE VARIABLE): >60 ML/MIN/1.73 M^2
GLUCOSE SERPL-MCNC: 117 MG/DL (ref 70–110)
HCT VFR BLD AUTO: 41.1 % (ref 40–54)
HGB BLD-MCNC: 13.9 G/DL (ref 14–18)
IMM GRANULOCYTES # BLD AUTO: 0.02 K/UL (ref 0–0.04)
IMM GRANULOCYTES NFR BLD AUTO: 0.4 % (ref 0–0.5)
IRON SERPL-MCNC: 148 UG/DL (ref 45–160)
LYMPHOCYTES # BLD AUTO: 1.3 K/UL (ref 1–4.8)
LYMPHOCYTES NFR BLD: 26.1 % (ref 18–48)
MCH RBC QN AUTO: 32.7 PG (ref 27–31)
MCHC RBC AUTO-ENTMCNC: 33.8 G/DL (ref 32–36)
MCV RBC AUTO: 97 FL (ref 82–98)
MONOCYTES # BLD AUTO: 0.5 K/UL (ref 0.3–1)
MONOCYTES NFR BLD: 9.8 % (ref 4–15)
NEUTROPHILS # BLD AUTO: 3.1 K/UL (ref 1.8–7.7)
NEUTROPHILS NFR BLD: 62.3 % (ref 38–73)
NRBC BLD-RTO: 0 /100 WBC
PLATELET # BLD AUTO: 251 K/UL (ref 150–450)
PMV BLD AUTO: 10.4 FL (ref 9.2–12.9)
POTASSIUM SERPL-SCNC: 4 MMOL/L (ref 3.5–5.1)
PROT SERPL-MCNC: 7.3 G/DL (ref 6–8.4)
RBC # BLD AUTO: 4.25 M/UL (ref 4.6–6.2)
SATURATED IRON: 34 % (ref 20–50)
SODIUM SERPL-SCNC: 138 MMOL/L (ref 136–145)
TOTAL IRON BINDING CAPACITY: 431 UG/DL (ref 250–450)
TRANSFERRIN SERPL-MCNC: 291 MG/DL (ref 200–375)
WBC # BLD AUTO: 4.91 K/UL (ref 3.9–12.7)

## 2024-09-16 PROCEDURE — 1159F MED LIST DOCD IN RCRD: CPT | Mod: CPTII,S$GLB,, | Performed by: INTERNAL MEDICINE

## 2024-09-16 PROCEDURE — 3074F SYST BP LT 130 MM HG: CPT | Mod: CPTII,S$GLB,, | Performed by: INTERNAL MEDICINE

## 2024-09-16 PROCEDURE — 36415 COLL VENOUS BLD VENIPUNCTURE: CPT | Mod: PN | Performed by: INTERNAL MEDICINE

## 2024-09-16 PROCEDURE — 84466 ASSAY OF TRANSFERRIN: CPT | Performed by: INTERNAL MEDICINE

## 2024-09-16 PROCEDURE — 99214 OFFICE O/P EST MOD 30 MIN: CPT | Mod: S$GLB,,, | Performed by: INTERNAL MEDICINE

## 2024-09-16 PROCEDURE — 80053 COMPREHEN METABOLIC PANEL: CPT | Performed by: INTERNAL MEDICINE

## 2024-09-16 PROCEDURE — 85025 COMPLETE CBC W/AUTO DIFF WBC: CPT | Performed by: INTERNAL MEDICINE

## 2024-09-16 PROCEDURE — 99999 PR PBB SHADOW E&M-EST. PATIENT-LVL III: CPT | Mod: PBBFAC,,, | Performed by: INTERNAL MEDICINE

## 2024-09-16 PROCEDURE — 3078F DIAST BP <80 MM HG: CPT | Mod: CPTII,S$GLB,, | Performed by: INTERNAL MEDICINE

## 2024-09-16 PROCEDURE — 1160F RVW MEDS BY RX/DR IN RCRD: CPT | Mod: CPTII,S$GLB,, | Performed by: INTERNAL MEDICINE

## 2024-09-16 PROCEDURE — 86140 C-REACTIVE PROTEIN: CPT | Performed by: INTERNAL MEDICINE

## 2024-09-16 PROCEDURE — 85651 RBC SED RATE NONAUTOMATED: CPT | Performed by: INTERNAL MEDICINE

## 2024-09-16 PROCEDURE — 3008F BODY MASS INDEX DOCD: CPT | Mod: CPTII,S$GLB,, | Performed by: INTERNAL MEDICINE

## 2024-09-16 NOTE — ASSESSMENT & PLAN NOTE
AHI was 7.0/hr  RDI 21/hr      Treatment Options for Sleep Disordered Breathing discussed:     [x]    Continuous Positive Airway Pressure (CPAP).  [x]    Surgical options  [x]    Oral appliances   []    Behavioral approaches.   []    Weight loss.   []    Avoiding alcohol and sedative medication.  []    Treat other underlying medical conditions eg. nasal allergies  [x]     INSPIRE

## 2024-09-16 NOTE — PROGRESS NOTES
Pulmonary Outpatient  Visit     Subjective:       Patient ID: Be Brito is a 29 y.o. male.    Social History     Tobacco Use   Smoking Status Every Day    Current packs/day: 1.00    Types: Cigarettes   Smokeless Tobacco Current            Chief Complaint: Sleep Apnea          Be Brito is 29 y.o.  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  Wake up frequently during the night     09/16/2024  Followup  HSAT reveiwed  RDI 21.0/hr   Options of therapy discussed                Review of Systems   Constitutional:  Negative for fatigue.   Respiratory:  Negative for apnea, snoring and somnolence.    Psychiatric/Behavioral:  Negative for sleep disturbance.        Outpatient Encounter Medications as of 9/16/2024   Medication Sig Dispense Refill    ferrous sulfate 324 mg (65 mg iron) TbEC Take 1 tablet (324 mg total) by mouth once daily. 90 tablet 3    naproxen (NAPROSYN) 500 MG tablet Take 1 tablet (500 mg total) by mouth 2 (two) times daily with meals. 60 tablet 1    sulfaSALAzine (AZULFIDINE) 500 MG EC tablet Take 1 tablet (500 mg total) by mouth 2 (two) times a day. 180 tablet 1    upadacitinib (RINVOQ) 15 mg 24 hr tablet Take 1 tablet (15 mg total) by mouth once daily.  30 tablet 11    acetaminophen (TYLENOL ORAL) Take by mouth. (Patient not taking: Reported on 9/16/2024)      aluminum chloride (DRYSOL) 20 % external solution Apply topically every evening. (Patient not taking: Reported on 9/16/2024) 35 mL 0    azelastine-fluticasone (DYMISTA) 137-50 mcg/spray Spry nassal spray 2 sprays by Each Nostril route once daily. (Patient not taking: Reported on 9/16/2024) 23 g 5    ciclopirox 0.77 % Gel Apply topically 2 (two) times daily. In between toe webspaces (Patient not taking: Reported on 9/16/2024) 30 g 1    erythromycin with ethanoL (ERYGEL) 2 % gel Apply topically once daily. For 14 days to bottom of feet. (Patient not taking: Reported on 9/16/2024) 30 g 2    methylPREDNISolone (MEDROL DOSEPACK) 4 mg tablet use as directed (Patient not taking: Reported on 9/16/2024) 21 tablet 0    predniSONE (DELTASONE) 5 MG tablet Take 1 tablet (5 mg total) by mouth once daily. (Patient not taking: Reported on 9/16/2024) 90 tablet 1     No facility-administered encounter medications on file as of 9/16/2024.       The following portions of the patient's history were reviewed and updated as appropriate: He  has no past medical history on file.  He does not have any pertinent problems on file.  He  has a past surgical history that includes Tonsillectomy and trigger finger injection.  His family history includes Arthritis in his father; Leukemia in his father; Lung cancer in his mother; Osteoporosis in his father.  He  reports that he has been smoking cigarettes. He uses smokeless tobacco. He reports current alcohol use of about 25.0 standard drinks of alcohol per week. He reports that he does not use drugs.  He has a current medication list which includes the following prescription(s): ferrous sulfate, naproxen, sulfasalazine, rinvoq, acetaminophen, aluminum chloride, azelastine-fluticasone, ciclopirox, erythromycin with ethanol, methylprednisolone, and prednisone.  Current Outpatient Medications on  File Prior to Visit   Medication Sig Dispense Refill    ferrous sulfate 324 mg (65 mg iron) TbEC Take 1 tablet (324 mg total) by mouth once daily. 90 tablet 3    naproxen (NAPROSYN) 500 MG tablet Take 1 tablet (500 mg total) by mouth 2 (two) times daily with meals. 60 tablet 1    sulfaSALAzine (AZULFIDINE) 500 MG EC tablet Take 1 tablet (500 mg total) by mouth 2 (two) times a day. 180 tablet 1    upadacitinib (RINVOQ) 15 mg 24 hr tablet Take 1 tablet (15 mg total) by mouth once daily. 30 tablet 11    acetaminophen (TYLENOL ORAL) Take by mouth. (Patient not taking: Reported on 9/16/2024)      aluminum chloride (DRYSOL) 20 % external solution Apply topically every evening. (Patient not taking: Reported on 9/16/2024) 35 mL 0    azelastine-fluticasone (DYMISTA) 137-50 mcg/spray Spry nassal spray 2 sprays by Each Nostril route once daily. (Patient not taking: Reported on 9/16/2024) 23 g 5    ciclopirox 0.77 % Gel Apply topically 2 (two) times daily. In between toe webspaces (Patient not taking: Reported on 9/16/2024) 30 g 1    erythromycin with ethanoL (ERYGEL) 2 % gel Apply topically once daily. For 14 days to bottom of feet. (Patient not taking: Reported on 9/16/2024) 30 g 2    methylPREDNISolone (MEDROL DOSEPACK) 4 mg tablet use as directed (Patient not taking: Reported on 9/16/2024) 21 tablet 0    predniSONE (DELTASONE) 5 MG tablet Take 1 tablet (5 mg total) by mouth once daily. (Patient not taking: Reported on 9/16/2024) 90 tablet 1     No current facility-administered medications on file prior to visit.     He has No Known Allergies..      BP Readings from Last 3 Encounters:   09/16/24 120/68   07/01/24 120/80   06/19/24 139/88     Snoring / Sleep:     [place] Questionnaire (validated BAUTISTA screening questionnaire)    YES -- Snoring/apnea    YES -- Fatigue    Body mass index is 23.63 kg/m².  (>25 is overweight, >30 is obese)    Blood Pressure = normal blood pressure  (PreHTN 120-139/80-89, Stg1 140-159/90-99, Stg2  ">160/>100)  [place] = 2 of three BAUTISTA categories are positive (high risk is 2-3 positive categories)     [place] Sleepiness Scale TOTAL =           9/16/2024     8:15 AM   EPWORTH SLEEPINESS SCALE   Sitting and reading 1   Watching TV 1   Sitting, inactive in a public place (e.g. a theatre or a meeting) 0   As a passenger in a car for an hour without a break 1   Lying down to rest in the afternoon when circumstances permit 2   Sitting and talking to someone 0   Sitting quietly after a lunch without alcohol 1   In a car, while stopped for a few minutes in traffic 0   Total score 6      (validated sleepiness questionnaire with a higher score indicating greater sleepiness; range 0-24)  Failed to redirect to the Timeline version of the [employer] SmartLink.                          No data to display                          Objective:     Vital Signs (Most Recent)  Vital Signs  Pulse: 60  Resp: 18  SpO2: 96 %  BP: 120/68  Height and Weight  Height: 5' 9" (175.3 cm)  Weight: 72.6 kg (160 lb)  BSA (Calculated - sq m): 1.88 sq meters  BMI (Calculated): 23.6  Weight in (lb) to have BMI = 25: 168.9]  Wt Readings from Last 2 Encounters:   09/16/24 72.6 kg (160 lb)   07/01/24 73.9 kg (162 lb 14.7 oz)       Physical Exam  Vitals and nursing note reviewed.   Constitutional:       Appearance: Normal appearance.   HENT:      Head: Normocephalic and atraumatic.      Right Ear: Tympanic membrane normal.      Nose: Nose normal.   Eyes:      Pupils: Pupils are equal, round, and reactive to light.   Cardiovascular:      Rate and Rhythm: Normal rate and regular rhythm.      Pulses: Normal pulses.      Heart sounds: Normal heart sounds.   Pulmonary:      Effort: Pulmonary effort is normal.      Breath sounds: Normal breath sounds.   Abdominal:      General: Bowel sounds are normal.      Palpations: Abdomen is soft.   Musculoskeletal:         General: Normal range of motion.      Cervical back: Normal range of motion.   Skin:     General: Skin " "is warm and dry.      Capillary Refill: Capillary refill takes less than 2 seconds.   Neurological:      General: No focal deficit present.      Mental Status: He is alert and oriented to person, place, and time.   Psychiatric:         Mood and Affect: Mood normal.          Laboratory  Lab Results   Component Value Date    WBC 5.67 05/10/2024    RBC 4.39 (L) 05/10/2024    HGB 13.9 (L) 06/19/2024    HCT 40.7 05/10/2024    MCV 93 05/10/2024    MCH 31.2 (H) 05/10/2024    MCHC 33.7 05/10/2024    RDW 12.7 05/10/2024     05/10/2024    MPV 10.1 05/10/2024    GRAN 3.2 05/10/2024    GRAN 55.4 05/10/2024    LYMPH 1.9 05/10/2024    LYMPH 33.9 05/10/2024    MONO 0.5 05/10/2024    MONO 8.5 05/10/2024    EOS 0.1 05/10/2024    BASO 0.05 05/10/2024    EOSINOPHIL 0.9 05/10/2024    BASOPHIL 0.9 05/10/2024       BMP  Lab Results   Component Value Date     05/10/2024    K 4.0 05/10/2024     05/10/2024    CO2 23 05/10/2024    BUN 20 05/10/2024    CREATININE 1.2 05/10/2024    CALCIUM 9.7 05/10/2024    ANIONGAP 9 05/10/2024    ESTGFRAFRICA >60.0 07/11/2022    EGFRNONAA >60.0 07/11/2022    AST 21 05/10/2024    ALT 9 (L) 05/10/2024    PROT 7.4 05/10/2024          No results found for: "IGE"     No results found for: "ASPERGILLUS"  No results found for: "AFUMIGATUSCL"     No results found for: "ACE"     Diagnostic Results:  I have personally reviewed today the following studies:    X-Ray Chest PA And Lateral  Narrative: EXAM: XR CHEST PA AND LATERAL    CLINICAL HISTORY:  Rheumatoid arthritis.    TECHNIQUE: 2 view chest x-ray.    FINDINGS: The heart size is normal. The lung fields are clear.  Impression:  No acute findings.    Finalized on: 2/23/2024 11:56 AM By:  Waldo Jarrell MD  [facility]# [id number]      2024-02-23 11:58:[id number]    [facility]     PHYSICIAN INTERPRETATION AND COMMENTS: Findings are consistent with moderate, positional obstructive sleep  apnea(BAUTISTA).  CLINICAL HISTORY: 29 year old male presented with: 16.5 inch neck, BMI " of 23.6, an [place] sleepiness score of 10, no comorbidities  and symptoms of nocturnal snoring, waking up choking and witnessed apneas. Based on the clinical history,  the patient has a high pre-test probability of having Moderate BAUTISTA.  SLEEP STUDY FINDINGS: Patient underwent a 1 night Home Sleep Test and by behavioral criteria, slept for approximately  6.73 hours, with a sleep latency of 6 minutes and a sleep efficiency of 98%. Mild sleep disordered breathing (AHI=7) is  noted based on a 4% hypopnea desaturation criteria, predominantly in the supine position (9 events/hour). The patient  slept supine 75.4% of the night based on valid recording time of 6.78 hours and is 9 times as likely to have  apneas/hypopneas when supine. When considering more subtle measures of sleep disordered breathing, the overall  respiratory disturbance index is moderate(RDI=21) based on a 1% hypopnea desaturation criteria with confirmation by  surrogate arousal indicators. The apneas/hypopneas are accompanied by minimal oxygen desaturation (percent time  below 90% SpO2: 0%, Min SpO2: 91.7%). The average desaturation across all sleep disordered breathing events is 1.6%.  Snoring occurs for 15.5% (30 dB) of the study. The mean pulse rate is 68.8 BPM, with very frequent pulse rate variability (93  events with >= 6 BPM increase/decrease per hour).  TREATMENT CONSIDERATIONS: Consider nasal continuous positive airway pressure (CPAP/AutoPAP) as the initial  treatment choice based on the RDI severity. A mandibular advancement splint (MAS) or referral to an ENT surgeon for  modification to the airway should be considered to reduce the potential risk of hypertension, cardiovascular disease,  stroke and diabetes if the CPAP trial is unsuccessful or the patient prefers an alternative therapy. Based on the BAUTISTA Supine  data in the study, a Mandibular Advancement Splint (MAS) will likely provide treatment benefit independent of BAUTISTA  severity. The  patient should avoid sleeping supine given non-supine AHI is in the normal range.  DISEASE MANAGEMENT CONSIDERATIONS: None.     Assessment/Plan:     Problem List Items Addressed This Visit       BAUTISTA (obstructive sleep apnea) - Primary     AHI was 7.0/hr  RDI 21/hr      Treatment Options for Sleep Disordered Breathing discussed:     [x]    Continuous Positive Airway Pressure (CPAP).  [x]    Surgical options  [x]    Oral appliances   []    Behavioral approaches.   []    Weight loss.   []    Avoiding alcohol and sedative medication.  []    Treat other underlying medical conditions eg. nasal allergies  [x]     INSPIRE            Relevant Orders    CPAP FOR HOME USE          Discussed therapeutic goals for positive airway pressure therapy(CPAP or BiPAP):   Ideal is usage 100% of nights for 6 - 8 hours per night.   Minimum usage is 70% of night for at least 4 hours per night used.  Mask choices discussed.  Patient expressed understanding. All Questions answered.      Follow up in about 10 weeks (around 11/25/2024), or CPAP.    This note was prepared using voice recognition system and is likely to have sound alike errors that may have been overlooked even after proof reading.  Please call me with any questions    Discussed diagnosis, its evaluation, treatment and usual course. All questions answered.    Thank you for the courtesy of participating in the care of this patient    Kem Sears MD      Personal Diagnostic Review  []  CXR    []  ECHO    []  ONSAT    []  6MWD    []  LABS    []  CHEST CT    []  PET CT    []  Biopsy results

## 2024-09-24 ENCOUNTER — OFFICE VISIT (OUTPATIENT)
Dept: RHEUMATOLOGY | Facility: CLINIC | Age: 29
End: 2024-09-24
Payer: COMMERCIAL

## 2024-09-24 VITALS
HEIGHT: 69 IN | DIASTOLIC BLOOD PRESSURE: 89 MMHG | HEART RATE: 107 BPM | SYSTOLIC BLOOD PRESSURE: 132 MMHG | BODY MASS INDEX: 25.37 KG/M2 | WEIGHT: 171.31 LBS

## 2024-09-24 DIAGNOSIS — Z79.899 DRUG-INDUCED IMMUNODEFICIENCY: ICD-10-CM

## 2024-09-24 DIAGNOSIS — M05.79 RHEUMATOID ARTHRITIS INVOLVING MULTIPLE SITES WITH POSITIVE RHEUMATOID FACTOR: Primary | ICD-10-CM

## 2024-09-24 DIAGNOSIS — Z51.81 ENCOUNTER FOR MEDICATION MONITORING: ICD-10-CM

## 2024-09-24 DIAGNOSIS — G47.33 OSA (OBSTRUCTIVE SLEEP APNEA): ICD-10-CM

## 2024-09-24 DIAGNOSIS — D84.821 DRUG-INDUCED IMMUNODEFICIENCY: ICD-10-CM

## 2024-09-24 DIAGNOSIS — R53.82 CHRONIC FATIGUE: ICD-10-CM

## 2024-09-24 PROCEDURE — 3079F DIAST BP 80-89 MM HG: CPT | Mod: CPTII,S$GLB,, | Performed by: INTERNAL MEDICINE

## 2024-09-24 PROCEDURE — G2211 COMPLEX E/M VISIT ADD ON: HCPCS | Mod: S$GLB,,, | Performed by: INTERNAL MEDICINE

## 2024-09-24 PROCEDURE — 1159F MED LIST DOCD IN RCRD: CPT | Mod: CPTII,S$GLB,, | Performed by: INTERNAL MEDICINE

## 2024-09-24 PROCEDURE — 3075F SYST BP GE 130 - 139MM HG: CPT | Mod: CPTII,S$GLB,, | Performed by: INTERNAL MEDICINE

## 2024-09-24 PROCEDURE — 99999 PR PBB SHADOW E&M-EST. PATIENT-LVL III: CPT | Mod: PBBFAC,,, | Performed by: INTERNAL MEDICINE

## 2024-09-24 PROCEDURE — 3008F BODY MASS INDEX DOCD: CPT | Mod: CPTII,S$GLB,, | Performed by: INTERNAL MEDICINE

## 2024-09-24 PROCEDURE — 99215 OFFICE O/P EST HI 40 MIN: CPT | Mod: S$GLB,,, | Performed by: INTERNAL MEDICINE

## 2024-09-24 PROCEDURE — 1160F RVW MEDS BY RX/DR IN RCRD: CPT | Mod: CPTII,S$GLB,, | Performed by: INTERNAL MEDICINE

## 2024-09-24 NOTE — PROGRESS NOTES
RHEUMATOLOGY CLINIC FOLLOW UP VISIT  Chief complaints, HPI, ROS, EXAM, Assessment & Plans:-  Be Bhatia a 29 y.o. pleasant male comes in for follow-up visit.  Seropositive nonerosive rheumatoid arthritis on Rinvoq.  Reports significant improvement of joint pain, stiffness and swelling on Rinvoq.  Denies any improvement of fatigue.  Had sleep study done and was diagnosed with obstructive sleep apnea-moderate.  Was advised to start CPAP therapy.    Rheumatological review of system negative otherwise.  Physical examination shows no active synovitis.  No effusion.  1. Rheumatoid arthritis involving multiple sites with positive rheumatoid factor    2. Drug-induced immunodeficiency    3. Encounter for medication monitoring    4. Chronic fatigue    5. BAUTISTA (obstructive sleep apnea)      Problem List Items Addressed This Visit       Chronic fatigue    Drug-induced immunodeficiency    Encounter for medication monitoring    BAUTISTA (obstructive sleep apnea)    Rheumatoid arthritis involving multiple sites with positive rheumatoid factor - Primary       Labs reviewed today:-   Latest Reference Range & Units 09/16/24 09:15   WBC 3.90 - 12.70 K/uL 4.91   RBC 4.60 - 6.20 M/uL 4.25 (L)   Hemoglobin 14.0 - 18.0 g/dL 13.9 (L)   Hematocrit 40.0 - 54.0 % 41.1   MCV 82 - 98 fL 97   MCH 27.0 - 31.0 pg 32.7 (H)   MCHC 32.0 - 36.0 g/dL 33.8   RDW 11.5 - 14.5 % 12.1   Platelet Count 150 - 450 K/uL 251   MPV 9.2 - 12.9 fL 10.4   Gran % 38.0 - 73.0 % 62.3   Lymph % 18.0 - 48.0 % 26.1   Mono % 4.0 - 15.0 % 9.8   Eos % 0.0 - 8.0 % 0.6   Basophil % 0.0 - 1.9 % 0.8   Immature Granulocytes 0.0 - 0.5 % 0.4   Gran # (ANC) 1.8 - 7.7 K/uL 3.1   Lymph # 1.0 - 4.8 K/uL 1.3   Mono # 0.3 - 1.0 K/uL 0.5   Eos # 0.0 - 0.5 K/uL 0.0   Baso # 0.00 - 0.20 K/uL 0.04   Immature Grans (Abs) 0.00 - 0.04 K/uL 0.02   nRBC 0 /100 WBC 0   Differential Method  Automated   Sed Rate 0 - 10 mm/Hr 5   (L): Data is  abnormally low  (H): Data is abnormally high     Latest Reference Range & Units Most Recent   TARIQ Screen Negative <1:80  Negative <1:80  7/11/22 11:51   CCP Antibodies <5.0 U/mL 98.8 (H)  7/21/22 09:37   RBC 4.60 - 6.20 M/uL 4.25 (L)  9/16/24 09:15   Hemoglobin 14.0 - 18.0 g/dL 13.9 (L)  9/16/24 09:15   Hematocrit 40.0 - 54.0 % 41.1  9/16/24 09:15   MCV 82 - 98 fL 97  9/16/24 09:15   RDW 11.5 - 14.5 % 12.1  9/16/24 09:15   Rheumatoid Factor 0.0 - 15.0 IU/mL 340.0 (H)  7/11/22 11:51   (H): Data is abnormally high  (L): Data is abnormally low        Significant improvement of seropositive nonerosive rheumatoid since starting Rinvoq.    Sleep study showed moderate sleep apnea and was advised to start CPAP therapy .  This explains his chronic unexplained fatigue.  Start CPAP as soon as possible.    Drug induced immunodeficiency due to use of immunosuppressive drugs. Monitor carefully for infections. Advised to get immediate medical care if any infection. Also advised strict adherence to age appropriate vaccinations and cancer screenings with PCP.  Hold Rinvoq if any infection  Safety every visit   I have explained all of the above in detail and the patient understands all of the current recommendation(s). I have answered all questions to the best of my ability and to their complete satisfaction.       # Follow up in about 6 months (around 3/24/2025).      Disclaimer: This note was prepared using voice recognition system and is likely to have sound alike errors and is not proof read.  Please call me with any questions.

## 2024-09-24 NOTE — LETTER
September 24, 2024        Lawanda Martinez MD  [id number] [facility] [address]             [address]  [address] [facility] [address]  Phone: [phone]  Fax: [phone]   Patient: Be Brito   MR Number: [id number]   YOB: 1995   Date of Visit: 9/24/2024       Dear Dr. Martinez:    Thank you for referring Be Brito to me for evaluation. Below are the relevant portions of my assessment and plan of care.            If you have questions, please do not hesitate to call me. I look forward to following Be along with you.    Sincerely,      Sen Tubbs MD           CC    No Recipients

## 2024-10-28 ENCOUNTER — TELEPHONE (OUTPATIENT)
Dept: PULMONOLOGY | Facility: CLINIC | Age: 29
End: 2024-10-28
Payer: COMMERCIAL

## 2024-12-09 ENCOUNTER — OFFICE VISIT (OUTPATIENT)
Dept: PULMONOLOGY | Facility: CLINIC | Age: 29
End: 2024-12-09
Payer: COMMERCIAL

## 2024-12-09 VITALS
RESPIRATION RATE: 16 BRPM | HEART RATE: 81 BPM | DIASTOLIC BLOOD PRESSURE: 80 MMHG | WEIGHT: 171.31 LBS | SYSTOLIC BLOOD PRESSURE: 132 MMHG | OXYGEN SATURATION: 98 % | HEIGHT: 69 IN | BODY MASS INDEX: 25.37 KG/M2

## 2024-12-09 DIAGNOSIS — Z72.0 TOBACCO USE: ICD-10-CM

## 2024-12-09 DIAGNOSIS — G47.33 OSA (OBSTRUCTIVE SLEEP APNEA): Primary | ICD-10-CM

## 2024-12-09 DIAGNOSIS — M05.79 RHEUMATOID ARTHRITIS INVOLVING MULTIPLE SITES WITH POSITIVE RHEUMATOID FACTOR: ICD-10-CM

## 2024-12-09 DIAGNOSIS — R53.82 CHRONIC FATIGUE: ICD-10-CM

## 2024-12-09 PROCEDURE — 3008F BODY MASS INDEX DOCD: CPT | Mod: CPTII,S$GLB,, | Performed by: INTERNAL MEDICINE

## 2024-12-09 PROCEDURE — 99999 PR PBB SHADOW E&M-EST. PATIENT-LVL V: CPT | Mod: PBBFAC,,, | Performed by: INTERNAL MEDICINE

## 2024-12-09 PROCEDURE — 1160F RVW MEDS BY RX/DR IN RCRD: CPT | Mod: CPTII,S$GLB,, | Performed by: INTERNAL MEDICINE

## 2024-12-09 PROCEDURE — 3079F DIAST BP 80-89 MM HG: CPT | Mod: CPTII,S$GLB,, | Performed by: INTERNAL MEDICINE

## 2024-12-09 PROCEDURE — 99214 OFFICE O/P EST MOD 30 MIN: CPT | Mod: S$GLB,,, | Performed by: INTERNAL MEDICINE

## 2024-12-09 PROCEDURE — 1159F MED LIST DOCD IN RCRD: CPT | Mod: CPTII,S$GLB,, | Performed by: INTERNAL MEDICINE

## 2024-12-09 PROCEDURE — 3075F SYST BP GE 130 - 139MM HG: CPT | Mod: CPTII,S$GLB,, | Performed by: INTERNAL MEDICINE

## 2024-12-09 NOTE — PROGRESS NOTES
Pulmonary Outpatient  Visit     Subjective:       Patient ID: Be Brito is a 29 y.o. male.    Social History     Tobacco Use   Smoking Status Every Day    Current packs/day: 1.00    Types: Cigarettes   Smokeless Tobacco Current            Chief Complaint: Sleep Apnea and Fatigue          Be Brito is 29 y.o.  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  Wake up frequently during the night     09/16/2024  Followup  HSAT reveiwed  RDI 21.0/hr   Options of therapy discussed      12/09/2024  Followup  Has not used CPAP since Nov 3rd  Tried different masks  Bed time 9-10 pm  Wake time 7 am  Options MAD versus Surgery              Review of Systems   Constitutional:  Negative for fatigue.   Respiratory:  Negative for apnea, snoring and somnolence.    Psychiatric/Behavioral:  Negative for sleep disturbance.        Outpatient Encounter Medications as of 12/9/2024   Medication Sig Dispense Refill    acetaminophen (TYLENOL ORAL) Take by mouth.      aluminum chloride (DRYSOL) 20 % external solution Apply topically every evening. 35 mL 0    azelastine-fluticasone (DYMISTA) 137-50 mcg/spray Spry nassal spray 2 sprays by Each Nostril route once daily. 23 g 5    ciclopirox 0.77 %  Gel Apply topically 2 (two) times daily. In between toe webspaces 30 g 1    erythromycin with ethanoL (ERYGEL) 2 % gel Apply topically once daily. For 14 days to bottom of feet. 30 g 2    ferrous sulfate 324 mg (65 mg iron) TbEC Take 1 tablet (324 mg total) by mouth once daily. 90 tablet 3    methylPREDNISolone (MEDROL DOSEPACK) 4 mg tablet use as directed 21 tablet 0    naproxen (NAPROSYN) 500 MG tablet Take 1 tablet (500 mg total) by mouth 2 (two) times daily with meals. 60 tablet 1    predniSONE (DELTASONE) 5 MG tablet Take 1 tablet (5 mg total) by mouth once daily. 90 tablet 1    sulfaSALAzine (AZULFIDINE) 500 MG EC tablet Take 1 tablet (500 mg total) by mouth 2 (two) times a day. 180 tablet 1    upadacitinib (RINVOQ) 15 mg 24 hr tablet Take 1 tablet (15 mg total) by mouth once daily. 30 tablet 11     No facility-administered encounter medications on file as of 12/9/2024.       The following portions of the patient's history were reviewed and updated as appropriate: He  has no past medical history on file.  He does not have any pertinent problems on file.  He  has a past surgical history that includes Tonsillectomy and trigger finger injection.  His family history includes Arthritis in his father; Leukemia in his father; Lung cancer in his mother; Osteoporosis in his father.  He  reports that he has been smoking cigarettes. He uses smokeless tobacco. He reports current alcohol use of about 25.0 standard drinks of alcohol per week. He reports that he does not use drugs.  He has a current medication list which includes the following prescription(s): acetaminophen, aluminum chloride, azelastine-fluticasone, ciclopirox, erythromycin with ethanol, ferrous sulfate, methylprednisolone, naproxen, prednisone, sulfasalazine, and rinvoq.  Current Outpatient Medications on File Prior to Visit   Medication Sig Dispense Refill    acetaminophen (TYLENOL ORAL) Take by mouth.      aluminum chloride (DRYSOL) 20 % external  solution Apply topically every evening. 35 mL 0    azelastine-fluticasone (DYMISTA) 137-50 mcg/spray Spry nassal spray 2 sprays by Each Nostril route once daily. 23 g 5    ciclopirox 0.77 % Gel Apply topically 2 (two) times daily. In between toe webspaces 30 g 1    erythromycin with ethanoL (ERYGEL) 2 % gel Apply topically once daily. For 14 days to bottom of feet. 30 g 2    ferrous sulfate 324 mg (65 mg iron) TbEC Take 1 tablet (324 mg total) by mouth once daily. 90 tablet 3    methylPREDNISolone (MEDROL DOSEPACK) 4 mg tablet use as directed 21 tablet 0    naproxen (NAPROSYN) 500 MG tablet Take 1 tablet (500 mg total) by mouth 2 (two) times daily with meals. 60 tablet 1    predniSONE (DELTASONE) 5 MG tablet Take 1 tablet (5 mg total) by mouth once daily. 90 tablet 1    sulfaSALAzine (AZULFIDINE) 500 MG EC tablet Take 1 tablet (500 mg total) by mouth 2 (two) times a day. 180 tablet 1    upadacitinib (RINVOQ) 15 mg 24 hr tablet Take 1 tablet (15 mg total) by mouth once daily. 30 tablet 11     No current facility-administered medications on file prior to visit.     He has No Known Allergies..      BP Readings from Last 3 Encounters:   12/09/24 132/80   09/24/24 132/89   09/16/24 120/68     Snoring / Sleep:     Glendo Questionnaire (validated BAUTISTA screening questionnaire)    YES -- Snoring/apnea    YES -- Fatigue    Body mass index is 25.3 kg/m².  (>25 is overweight, >30 is obese)    Blood Pressure = normal blood pressure  (PreHTN 120-139/80-89, Stg1 140-159/90-99, Stg2 >160/>100)  Glendo = 2 of three BAUTISTA categories are positive (high risk is 2-3 positive categories)     [place] Sleepiness Scale TOTAL =           9/16/2024     8:15 AM   EPWORTH SLEEPINESS SCALE   Sitting and reading 1   Watching TV 1   Sitting, inactive in a public place (e.g. a theatre or a meeting) 0   As a passenger in a car for an hour without a break 1   Lying down to rest in the afternoon when circumstances permit 2   Sitting and talking to  "someone 0   Sitting quietly after a lunch without alcohol 1   In a car, while stopped for a few minutes in traffic 0   Total score 6      (validated sleepiness questionnaire with a higher score indicating greater sleepiness; range 0-24)  Failed to redirect to the Timeline version of the [email or web address] SmartLink.                          No data to display                          Objective:     Vital Signs (Most Recent)  Vital Signs  Pulse: 81  Resp: 16  SpO2: 98 %  BP: 132/80  Patient Position: Sitting  Pain Score: 0-No pain  Height and Weight  Height: 5' 9" (175.3 cm)  Weight: 77.7 kg (171 lb 4.8 oz)  BSA (Calculated - sq m): 1.94 sq meters  BMI (Calculated): 25.3  Weight in (lb) to have BMI = 25: 168.9]  Wt Readings from Last 2 Encounters:   12/09/24 77.7 kg (171 lb 4.8 oz)   09/24/24 77.7 kg (171 lb 4.8 oz)       Physical Exam  Vitals and nursing note reviewed.   Constitutional:       Appearance: Normal appearance.   HENT:      Head: Normocephalic and atraumatic.      Right Ear: Tympanic membrane normal.      Nose: Nose normal.   Eyes:      Pupils: Pupils are equal, round, and reactive to light.   Cardiovascular:      Rate and Rhythm: Normal rate and regular rhythm.      Pulses: Normal pulses.      Heart sounds: Normal heart sounds.   Pulmonary:      Effort: Pulmonary effort is normal.      Breath sounds: Normal breath sounds.   Abdominal:      General: Bowel sounds are normal.      Palpations: Abdomen is soft.   Musculoskeletal:         General: Normal range of motion.      Cervical back: Normal range of motion.   Skin:     General: Skin is warm and dry.      Capillary Refill: Capillary refill takes less than 2 seconds.   Neurological:      General: No focal deficit present.      Mental Status: He is alert and oriented to person, place, and time.   Psychiatric:         Mood and Affect: Mood normal.          Laboratory  Lab Results   Component Value Date    WBC 4.91 09/16/2024    RBC 4.25 (L) 09/16/2024    HGB 13.9 (L) " "09/16/2024    HCT 41.1 09/16/2024    MCV 97 09/16/2024    MCH 32.7 (H) 09/16/2024    MCHC 33.8 09/16/2024    RDW 12.1 09/16/2024     09/16/2024    MPV 10.4 09/16/2024    GRAN 3.1 09/16/2024    GRAN 62.3 09/16/2024    LYMPH 1.3 09/16/2024    LYMPH 26.1 09/16/2024    MONO 0.5 09/16/2024    MONO 9.8 09/16/2024    EOS 0.0 09/16/2024    BASO 0.04 09/16/2024    EOSINOPHIL 0.6 09/16/2024    BASOPHIL 0.8 09/16/2024       BMP  Lab Results   Component Value Date     09/16/2024    K 4.0 09/16/2024     09/16/2024    CO2 24 09/16/2024    BUN 10 09/16/2024    CREATININE 1.0 09/16/2024    CALCIUM 9.8 09/16/2024    ANIONGAP 11 09/16/2024    ESTGFRAFRICA >60.0 07/11/2022    EGFRNONAA >60.0 07/11/2022    AST 22 09/16/2024    ALT 16 09/16/2024    PROT 7.3 09/16/2024          No results found for: "IGE"     No results found for: "ASPERGILLUS"  No results found for: "AFUMIGATUSCL"     No results found for: "ACE"     Diagnostic Results:  I have personally reviewed today the following studies:    X-Ray Chest PA And Lateral  Narrative: EXAM: XR CHEST PA AND LATERAL    CLINICAL HISTORY:  Rheumatoid arthritis.    TECHNIQUE: 2 view chest x-ray.    FINDINGS: The heart size is normal. The lung fields are clear.  Impression:  No acute findings.    Finalized on: 2/23/2024 11:56 AM By:  Waldo Jarrell MD  [facility]# [id number]      2024-02-23 11:58:[id number]    [facility]     PHYSICIAN INTERPRETATION AND COMMENTS: Findings are consistent with moderate, positional obstructive sleep  apnea(BAUTISTA).  CLINICAL HISTORY: 29 year old male presented with: 16.5 inch neck, BMI of 23.6, an [place] sleepiness score of 10, no comorbidities  and symptoms of nocturnal snoring, waking up choking and witnessed apneas. Based on the clinical history,  the patient has a high pre-test probability of having Moderate BAUTISTA.  SLEEP STUDY FINDINGS: Patient underwent a 1 night Home Sleep Test and by behavioral criteria, slept for approximately  6.73 hours, with a sleep latency " of 6 minutes and a sleep efficiency of 98%. Mild sleep disordered breathing (AHI=7) is  noted based on a 4% hypopnea desaturation criteria, predominantly in the supine position (9 events/hour). The patient  slept supine 75.4% of the night based on valid recording time of 6.78 hours and is 9 times as likely to have  apneas/hypopneas when supine. When considering more subtle measures of sleep disordered breathing, the overall  respiratory disturbance index is moderate(RDI=21) based on a 1% hypopnea desaturation criteria with confirmation by  surrogate arousal indicators. The apneas/hypopneas are accompanied by minimal oxygen desaturation (percent time  below 90% SpO2: 0%, Min SpO2: 91.7%). The average desaturation across all sleep disordered breathing events is 1.6%.  Snoring occurs for 15.5% (30 dB) of the study. The mean pulse rate is 68.8 BPM, with very frequent pulse rate variability (93  events with >= 6 BPM increase/decrease per hour).  TREATMENT CONSIDERATIONS: Consider nasal continuous positive airway pressure (CPAP/AutoPAP) as the initial  treatment choice based on the RDI severity. A mandibular advancement splint (MAS) or referral to an ENT surgeon for  modification to the airway should be considered to reduce the potential risk of hypertension, cardiovascular disease,  stroke and diabetes if the CPAP trial is unsuccessful or the patient prefers an alternative therapy. Based on the BAUTISTA Supine  data in the study, a Mandibular Advancement Splint (MAS) will likely provide treatment benefit independent of BAUTISTA  severity. The patient should avoid sleeping supine given non-supine AHI is in the normal range.  DISEASE MANAGEMENT CONSIDERATIONS: None.       Be Brito  10/06/2024 - 2024  Patient ID: [id number]  : 1995  Age: 29 years  Gender: Male  [facility]  [address] [facility], [address]  Compliance Report  Compliance  Payor Standard  Usage 10/06/2024 -  12/04/2024  Usage days 30/60 days (50%)  >= 4 hours 26 days (43%)  < 4 hours 4 days (7%)  Usage hours 168 hours 16 minutes  Average usage (total days) 2 hours 48 minutes  Average usage (days used) 5 hours 37 minutes  Median usage (days used) 5 hours 42 minutes  Total used hours (value since last reset - 12/04/2024) 201 hours  AirSense 11 AutoSet  Serial number [id number]  Mode AutoSet  Min Pressure 5 cmH2O  Max Pressure 20 cmH2O  EPR Fulltime  EPR level 2  Response Standard  Therapy  Pressure - cmH2O Median: 6.8 95th percentile: 9.4 Maximum: 10.4  Leaks - L/min Median: 2.1 95th percentile: 13.2 Maximum: 35.4  Events per hour AI: 2.4 HI: 0.3 AHI: 2.7  Apnea Index Central: 1.2 Obstructive: 0.9 Unknown: 0.2  RERA Index 0.0  Cheyne-Marin respiration (average duration per night) 0 minutes (0%)  Usage - hours  Assessment/Plan:     Problem List Items Addressed This Visit       Rheumatoid arthritis involving multiple sites with positive rheumatoid factor     On RINVOQ         Chronic fatigue     Attest feeling better with several lifestyle changes         BAUTISTA (obstructive sleep apnea) - Primary           12/9/2024    10:57 AM   EPWORTH SLEEPINESS SCALE   Sitting and reading 0   Watching TV 0   Sitting, inactive in a public place (e.g. a theatre or a meeting) 0   As a passenger in a car for an hour without a break 0   Lying down to rest in the afternoon when circumstances permit 2   Sitting and talking to someone 0   Sitting quietly after a lunch without alcohol 1   In a car, while stopped for a few minutes in traffic 0   Total score 3        Data 10/06/2024 to 12/04/2024  APAP 5-20  Usage > 4 hrs was 43%    AHI was 2.7    Not worn since Nov 10th to Dec 9th    Bed time 9-10 pm  Wake time 7 am    Gained 10Lbs    Not motivated to revisit PAP therapy at all  Risk of untreated understood    Opptions MAD versus ENT eligible procedures         Relevant Orders    Ambulatory referral/consult to Dentistry    Ambulatory  referral/consult to ENT    Tobacco use     Smoking cessation               Discussed therapeutic goals for positive airway pressure therapy(CPAP or BiPAP):   Ideal is usage 100% of nights for 6 - 8 hours per night.   Minimum usage is 70% of night for at least 4 hours per night used.  Mask choices discussed.  Patient expressed understanding. All Questions answered.       Treatment Options for Sleep Disordered Breathing discussed:     [x]    Continuous Positive Airway Pressure (CPAP).  [x]    Surgical options  [x]    Oral appliances   [x]    Behavioral approaches.   []    Weight loss.   []    Avoiding alcohol and sedative medication.  []    Treat other underlying medical conditions eg. nasal allergies  [x]     INSPIRE        Follow up in about 6 months (around 6/9/2025), or ref ENT, External ref faxed to Dr Lejeune DDS.    This note was prepared using voice recognition system and is likely to have sound alike errors that may have been overlooked even after proof reading.  Please call me with any questions    Discussed diagnosis, its evaluation, treatment and usual course. All questions answered.    Thank you for the courtesy of participating in the care of this patient    Kem Sears MD      Personal Diagnostic Review  []  CXR    []  ECHO    []  ONSAT    []  6MWD    []  LABS    []  CHEST CT    []  PET CT    []  Biopsy results

## 2024-12-09 NOTE — ASSESSMENT & PLAN NOTE
12/9/2024    10:57 AM   EPWORTH SLEEPINESS SCALE   Sitting and reading 0   Watching TV 0   Sitting, inactive in a public place (e.g. a theatre or a meeting) 0   As a passenger in a car for an hour without a break 0   Lying down to rest in the afternoon when circumstances permit 2   Sitting and talking to someone 0   Sitting quietly after a lunch without alcohol 1   In a car, while stopped for a few minutes in traffic 0   Total score 3        Data 10/06/2024 to 12/04/2024  APAP 5-20  Usage > 4 hrs was 43%    AHI was 2.7    Not worn since Nov 10th to Dec 9th    Bed time 9-10 pm  Wake time 7 am    Gained 10Lbs    Not motivated to revisit PAP therapy at all  Risk of untreated understood    Opptions MAD versus ENT eligible procedures

## 2025-01-14 DIAGNOSIS — M05.79 RHEUMATOID ARTHRITIS INVOLVING MULTIPLE SITES WITH POSITIVE RHEUMATOID FACTOR: ICD-10-CM

## 2025-01-14 DIAGNOSIS — M06.9 FLARE OF RHEUMATOID ARTHRITIS: ICD-10-CM

## 2025-01-14 RX ORDER — PREDNISONE 5 MG/1
TABLET ORAL
Qty: 90 TABLET | Refills: 0 | Status: SHIPPED | OUTPATIENT
Start: 2025-01-14

## 2025-01-27 ENCOUNTER — OFFICE VISIT (OUTPATIENT)
Dept: OTOLARYNGOLOGY | Facility: CLINIC | Age: 30
End: 2025-01-27
Payer: COMMERCIAL

## 2025-01-27 VITALS — HEIGHT: 69 IN | BODY MASS INDEX: 25.37 KG/M2 | WEIGHT: 171.31 LBS

## 2025-01-27 DIAGNOSIS — T88.8XXA MALFUNCTION OF CONTINUOUS POSITIVE AIRWAY PRESSURE (CPAP) OR BILEVEL POSITIVE AIRWAY PRESSURE (BPAP) MACHINE, INITIAL ENCOUNTER: Primary | ICD-10-CM

## 2025-01-27 DIAGNOSIS — F17.200 NICOTINE DEPENDENCE, UNCOMPLICATED, UNSPECIFIED NICOTINE PRODUCT TYPE: ICD-10-CM

## 2025-01-27 DIAGNOSIS — J34.89 NASAL OBSTRUCTION: ICD-10-CM

## 2025-01-27 DIAGNOSIS — G47.33 OSA (OBSTRUCTIVE SLEEP APNEA): ICD-10-CM

## 2025-01-27 PROCEDURE — 99213 OFFICE O/P EST LOW 20 MIN: CPT | Mod: 25,S$GLB,, | Performed by: STUDENT IN AN ORGANIZED HEALTH CARE EDUCATION/TRAINING PROGRAM

## 2025-01-27 PROCEDURE — 99999 PR PBB SHADOW E&M-EST. PATIENT-LVL III: CPT | Mod: PBBFAC,,, | Performed by: STUDENT IN AN ORGANIZED HEALTH CARE EDUCATION/TRAINING PROGRAM

## 2025-01-27 PROCEDURE — 3008F BODY MASS INDEX DOCD: CPT | Mod: CPTII,S$GLB,, | Performed by: STUDENT IN AN ORGANIZED HEALTH CARE EDUCATION/TRAINING PROGRAM

## 2025-01-27 PROCEDURE — 31575 DIAGNOSTIC LARYNGOSCOPY: CPT | Mod: S$GLB,,, | Performed by: STUDENT IN AN ORGANIZED HEALTH CARE EDUCATION/TRAINING PROGRAM

## 2025-01-27 PROCEDURE — 1159F MED LIST DOCD IN RCRD: CPT | Mod: CPTII,S$GLB,, | Performed by: STUDENT IN AN ORGANIZED HEALTH CARE EDUCATION/TRAINING PROGRAM

## 2025-01-27 RX ORDER — AZELASTINE 1 MG/ML
1 SPRAY, METERED NASAL 2 TIMES DAILY
Qty: 30 ML | Refills: 3 | Status: SHIPPED | OUTPATIENT
Start: 2025-01-27 | End: 2026-01-27

## 2025-01-27 NOTE — PROGRESS NOTES
"Chief complaint:    Chief Complaint   Patient presents with    Sleep Apnea           Referring Provider:  Kem Sears Md  [address] [address] [place]  [place] [address]      History of present illness:     Be is here for Obstructive sleep apnea and intolerance of CPAP.  he was diagnosed with BAUTISTA 6 months years ago.   he has issues with CPAP compliance: yes, very uncomfortable on his face  Previous surgical treatments for sleep apnea: none  Body mass index is 25.3 kg/m².      Pertinent medical issues: none  Anticoagulation: none  Pertinent surgery: tonsillectomy at 3     He does endorse nasal obstruction, congestion - all the time  He does get frequent sinus infections  When allergies are severe he will use flonase a couple times a year    Severe sneezing and itching during spring and summer.       History      Past Medical History: No past medical history on file.      Past Surgical History:  Past Surgical History:   Procedure Laterality Date    TONSILLECTOMY      trigger finger injection           Medications: Medication list reviewed. He  has a current medication list which includes the following prescription(s): acetaminophen, aluminum chloride, azelastine-fluticasone, ciclopirox, erythromycin with ethanol, ferrous sulfate, methylprednisolone, naproxen, prednisone, sulfasalazine, and rinvoq.     Allergies: Review of patient's allergies indicates:  No Known Allergies      Family history: family history includes Arthritis in his father; Leukemia in his father; Lung cancer in his mother; Osteoporosis in his father.         Social History          Alcohol use:  reports current alcohol use of about 25.0 standard drinks of alcohol per week.            Tobacco:  reports that he has been smoking cigarettes. He uses smokeless tobacco.         Physical Examination      Vitals: Height 5' 9" (1.753 m), weight 77.7 kg (171 lb 4.8 oz).      General: Well developed, well nourished, well hydrated.     Voice: no " dysphonia, no dysarthria      Head/Face: Normocephalic, atraumatic. No scars or lesions. Facial musculature equal.     Eyes: No scleral icterus or conjunctival hemorrhage. EOMI. PERRLA.     Ears:     Right ear: No gross deformity. EAC is clear of debris and erythema. TM are intact with a pneumatized middle ear. No signs of retraction, fluid or infection.      Left ear: No gross deformity. EAC is clear of debris and erythema. TM are intact with a pneumatized middle ear. No signs of retraction, fluid or infection.      Nose: No gross deformity or lesions. No purulent discharge. No significant NSD.      Mouth/Oropharynx: Lips without any lesions. No mucosal lesions within the oropharynx. No tonsillar exudate or lesions. Pharyngeal walls symmetrical. Uvula midline. Tongue midline without lesions.     Neck: Trachea midline. No masses. No thyromegaly or nodules palpated.     Lymphatic: No lymphadenopathy in the neck.     Extremities: No cyanosis. Warm and well-perfused.     Skin: No scars or lesions on face or neck.      Neurologic: Moving all extremities without gross abnormality.CN II-XII grossly intact. House-Brackmann 1/6. No signs of nystagmus.          Data reviewed      Review of records:      I reviewed records from the referring provider's office visits.  These describe the history, workup, and/or treatment of this problem thus far    Polysomnogram   results independently reviewed:      Procedures:    Procedure -Transnasal fiberoptic laryngoscopy     Surgeon: John Fernandez M.D. .      Anesthesia: topical 0.05% oxymetazoline with 4% lidocaine      Complications: None.     Description of Procedure: With the patient in the sitting position, topical lidocaine and oxymetazoline was applied to the nose. The scope was passed through the nose. Examination was carried out of the nose, nasopharynx, oropharynx, hypopharynx, and larynx with findings as noted above. Scope was removed. The patient tolerated the procedure well.       Findings: No masses or lesions in the nose, nasopharynx, oropharynx, hypopharynx, or larynx. Vocal fold abduction and adduction is normal. No pooling of secretions in the piriform sinuses, penetration, or aspiration.        Assessment/Plan:    1. Malfunction of continuous positive airway pressure (CPAP) or bilevel positive airway pressure (BPAP) machine, initial encounter    2. BAUTISTA (obstructive sleep apnea)    3. Nicotine dependence, uncomplicated, unspecified nicotine product type          Be and TITI discussed at length that obstructive sleep apnea is a serious condition that may lead to hypertension, cardiovascular compromise and possibly stroke.  We discussed that CPAP is first line therapy and Be has tried this in the past.  Be does not tolerate CPAP well.  We discussed that obstructive sleep apnea is frequently a multilevel disease and the first goal of surgery is to make CPAP more tolerable.  The most common means of accomplishing this is by reducing nasal airway resistance with septoplasty and turbinate reduction for those with septal deviation and turbinate hypertrophy. Even in this situation, surgery will no eliminate the need for CPAP, but instead can improve CPAP tolerance.      Additionally, in patients with obstruction at the level of the soft palate, base of tongue, or epiglottis there are some surgeries that can specifically address these areas of obstruction, such as a UPPP (uvulopalatopharyngoplasty). However, most such surgical options have relatively low success rates, are very painful, and a large percentage will end up still needing CPAP.       There is one surgical procedure that is proving to be quite successful for well-qualified patients. The hypoglossal nerve stimulator (HGNS) is currently approved for the treatment of obstructive sleep apnea in patients meeting specific AHI and BMI criteria with appropriate velopharyngeal collapse on drug-induced sleep endoscopy, who  have failed CPAP. Work-up requires a sedated sleep endoscopy in the operating room to ensure the appropriate collapse is identified.       His nasal congestion is mostly due to his allergic rhinitis, and irritation from smoking and drinking. He does not any significant surgically correctable upper airway issues.   He will try flonase daily, BID astelin, and saline irrigations    Consider oral appliance for BAUTISTA is mild-mod and he cannot tolerate CPAP            John Fernandez MD  [facility]   [facility] [address]  [place] [address]  P: [phone]  F: [phone]

## 2025-03-24 ENCOUNTER — LAB VISIT (OUTPATIENT)
Dept: LAB | Facility: HOSPITAL | Age: 30
End: 2025-03-24
Attending: INTERNAL MEDICINE
Payer: COMMERCIAL

## 2025-03-24 DIAGNOSIS — Z79.899 DRUG-INDUCED IMMUNODEFICIENCY: ICD-10-CM

## 2025-03-24 DIAGNOSIS — Z51.81 ENCOUNTER FOR MEDICATION MONITORING: ICD-10-CM

## 2025-03-24 DIAGNOSIS — D84.821 DRUG-INDUCED IMMUNODEFICIENCY: ICD-10-CM

## 2025-03-24 DIAGNOSIS — M06.9 FLARE OF RHEUMATOID ARTHRITIS: ICD-10-CM

## 2025-03-24 DIAGNOSIS — M05.79 RHEUMATOID ARTHRITIS INVOLVING MULTIPLE SITES WITH POSITIVE RHEUMATOID FACTOR: ICD-10-CM

## 2025-03-24 LAB
ABSOLUTE EOSINOPHIL (OHS): 0.01 K/UL
ABSOLUTE MONOCYTE (OHS): 0.48 K/UL (ref 0.3–1)
ABSOLUTE NEUTROPHIL COUNT (OHS): 4.83 K/UL (ref 1.8–7.7)
ALBUMIN SERPL BCP-MCNC: 4.7 G/DL (ref 3.5–5.2)
ALP SERPL-CCNC: 45 UNIT/L (ref 40–150)
ALT SERPL W/O P-5'-P-CCNC: 12 UNIT/L (ref 10–44)
ANION GAP (OHS): 13 MMOL/L (ref 8–16)
AST SERPL-CCNC: 18 UNIT/L (ref 11–45)
BASOPHILS # BLD AUTO: 0.05 K/UL
BASOPHILS NFR BLD AUTO: 0.7 %
BILIRUB SERPL-MCNC: 1.2 MG/DL (ref 0.1–1)
BUN SERPL-MCNC: 14 MG/DL (ref 6–20)
CALCIUM SERPL-MCNC: 9.8 MG/DL (ref 8.7–10.5)
CHLORIDE SERPL-SCNC: 103 MMOL/L (ref 95–110)
CO2 SERPL-SCNC: 22 MMOL/L (ref 23–29)
CREAT SERPL-MCNC: 0.9 MG/DL (ref 0.5–1.4)
CRP SERPL-MCNC: 0.7 MG/L
ERYTHROCYTE [DISTWIDTH] IN BLOOD BY AUTOMATED COUNT: 12.4 % (ref 11.5–14.5)
ERYTHROCYTE [SEDIMENTATION RATE] IN BLOOD: 11 MM/HR
GFR SERPLBLD CREATININE-BSD FMLA CKD-EPI: >60 ML/MIN/1.73/M2
GLUCOSE SERPL-MCNC: 91 MG/DL (ref 70–110)
HCT VFR BLD AUTO: 44 % (ref 40–54)
HGB BLD-MCNC: 14.7 GM/DL (ref 14–18)
IMM GRANULOCYTES # BLD AUTO: 0.03 K/UL (ref 0–0.04)
IMM GRANULOCYTES NFR BLD AUTO: 0.4 % (ref 0–0.5)
LYMPHOCYTES # BLD AUTO: 1.4 K/UL (ref 1–4.8)
MCH RBC QN AUTO: 31.9 PG (ref 27–50)
MCHC RBC AUTO-ENTMCNC: 33.4 G/DL (ref 32–36)
MCV RBC AUTO: 95 FL (ref 82–98)
NUCLEATED RBC (/100WBC) (OHS): 0 /100 WBC
PLATELET # BLD AUTO: 304 K/UL (ref 150–450)
PMV BLD AUTO: 9.8 FL (ref 9.2–12.9)
POTASSIUM SERPL-SCNC: 3.9 MMOL/L (ref 3.5–5.1)
PROT SERPL-MCNC: 7.9 GM/DL (ref 6–8.4)
RBC # BLD AUTO: 4.61 M/UL (ref 4.6–6.2)
RELATIVE EOSINOPHIL (OHS): 0.1 %
RELATIVE LYMPHOCYTE (OHS): 20.6 % (ref 18–48)
RELATIVE MONOCYTE (OHS): 7.1 % (ref 4–15)
RELATIVE NEUTROPHIL (OHS): 71.1 % (ref 38–73)
SODIUM SERPL-SCNC: 138 MMOL/L (ref 136–145)
WBC # BLD AUTO: 6.8 K/UL (ref 3.9–12.7)

## 2025-03-24 PROCEDURE — 86140 C-REACTIVE PROTEIN: CPT

## 2025-03-24 PROCEDURE — 85652 RBC SED RATE AUTOMATED: CPT

## 2025-03-24 PROCEDURE — 80053 COMPREHEN METABOLIC PANEL: CPT

## 2025-03-24 PROCEDURE — 87340 HEPATITIS B SURFACE AG IA: CPT

## 2025-03-24 PROCEDURE — 86704 HEP B CORE ANTIBODY TOTAL: CPT

## 2025-03-24 PROCEDURE — 85025 COMPLETE CBC W/AUTO DIFF WBC: CPT

## 2025-03-24 PROCEDURE — 36415 COLL VENOUS BLD VENIPUNCTURE: CPT

## 2025-03-24 PROCEDURE — 86480 TB TEST CELL IMMUN MEASURE: CPT

## 2025-03-24 PROCEDURE — 86706 HEP B SURFACE ANTIBODY: CPT

## 2025-03-25 LAB
HBV CORE AB SERPL QL IA: NORMAL
HBV SURFACE AB SER-ACNC: <3 MIU/ML
HBV SURFACE AB SERPL IA-ACNC: NORMAL M[IU]/ML
HBV SURFACE AG SERPL QL IA: NORMAL

## 2025-03-26 LAB
GAMMA INTERFERON BACKGROUND BLD IA-ACNC: 0.01 [IU]/ML
M TB IFN-G CD4+ BCKGRND COR BLD-ACNC: 0.03 [IU]/ML
MITOGEN IGNF BCKGRD COR BLD-ACNC: 1.63 [IU]/ML
QUANTIFERON-TB GOLD PLUS RESULT: NEGATIVE
TB2 AG MINUS NIL RESULT (OHS): 0.01

## 2025-04-01 ENCOUNTER — TELEPHONE (OUTPATIENT)
Dept: RHEUMATOLOGY | Facility: CLINIC | Age: 30
End: 2025-04-01
Payer: COMMERCIAL

## 2025-04-02 ENCOUNTER — OFFICE VISIT (OUTPATIENT)
Dept: RHEUMATOLOGY | Facility: CLINIC | Age: 30
End: 2025-04-02
Payer: COMMERCIAL

## 2025-04-02 VITALS
BODY MASS INDEX: 24.94 KG/M2 | WEIGHT: 168.88 LBS | SYSTOLIC BLOOD PRESSURE: 143 MMHG | DIASTOLIC BLOOD PRESSURE: 94 MMHG | HEART RATE: 97 BPM

## 2025-04-02 DIAGNOSIS — F40.231 SEVERE NEEDLE PHOBIA: ICD-10-CM

## 2025-04-02 DIAGNOSIS — Z51.81 ENCOUNTER FOR MEDICATION MONITORING: ICD-10-CM

## 2025-04-02 DIAGNOSIS — M05.79 RHEUMATOID ARTHRITIS INVOLVING MULTIPLE SITES WITH POSITIVE RHEUMATOID FACTOR: Primary | ICD-10-CM

## 2025-04-02 DIAGNOSIS — D84.821 DRUG-INDUCED IMMUNODEFICIENCY: ICD-10-CM

## 2025-04-02 DIAGNOSIS — R53.82 CHRONIC FATIGUE: ICD-10-CM

## 2025-04-02 DIAGNOSIS — G47.33 OSA (OBSTRUCTIVE SLEEP APNEA): ICD-10-CM

## 2025-04-02 DIAGNOSIS — Z79.899 DRUG-INDUCED IMMUNODEFICIENCY: ICD-10-CM

## 2025-04-02 PROCEDURE — G2211 COMPLEX E/M VISIT ADD ON: HCPCS | Mod: S$GLB,,, | Performed by: INTERNAL MEDICINE

## 2025-04-02 PROCEDURE — 3008F BODY MASS INDEX DOCD: CPT | Mod: CPTII,S$GLB,, | Performed by: INTERNAL MEDICINE

## 2025-04-02 PROCEDURE — 3080F DIAST BP >= 90 MM HG: CPT | Mod: CPTII,S$GLB,, | Performed by: INTERNAL MEDICINE

## 2025-04-02 PROCEDURE — 1160F RVW MEDS BY RX/DR IN RCRD: CPT | Mod: CPTII,S$GLB,, | Performed by: INTERNAL MEDICINE

## 2025-04-02 PROCEDURE — 3077F SYST BP >= 140 MM HG: CPT | Mod: CPTII,S$GLB,, | Performed by: INTERNAL MEDICINE

## 2025-04-02 PROCEDURE — 99215 OFFICE O/P EST HI 40 MIN: CPT | Mod: S$GLB,,, | Performed by: INTERNAL MEDICINE

## 2025-04-02 PROCEDURE — 1159F MED LIST DOCD IN RCRD: CPT | Mod: CPTII,S$GLB,, | Performed by: INTERNAL MEDICINE

## 2025-04-02 PROCEDURE — 99999 PR PBB SHADOW E&M-EST. PATIENT-LVL III: CPT | Mod: PBBFAC,,, | Performed by: INTERNAL MEDICINE

## 2025-04-02 RX ORDER — SULFASALAZINE 500 MG/1
500 TABLET, DELAYED RELEASE ORAL 2 TIMES DAILY
Qty: 180 TABLET | Refills: 1 | Status: SHIPPED | OUTPATIENT
Start: 2025-04-02

## 2025-04-02 RX ORDER — UPADACITINIB 15 MG/1
15 TABLET, EXTENDED RELEASE ORAL DAILY
Qty: 30 TABLET | Refills: 11 | Status: ACTIVE | OUTPATIENT
Start: 2025-04-02

## 2025-04-02 RX ORDER — PREDNISONE 2.5 MG/1
2.5 TABLET ORAL DAILY
Qty: 90 TABLET | Refills: 0 | Status: SHIPPED | OUTPATIENT
Start: 2025-04-02

## 2025-04-02 NOTE — PROGRESS NOTES
RHEUMATOLOGY CLINIC FOLLOW UP VISIT  Chief complaints, HPI, ROS, EXAM, Assessment & Plans:-  Be Bhatia a 30 y.o. pleasant male comes in for follow-up visit.  Seropositive nonerosive rheumatoid arthritis on Rinvoq.  Reports significant improvement of joint pain, stiffness and swelling on Rinvoq.  Denies any improvement of fatigue.  Had sleep study done and was diagnosed with obstructive sleep apnea-moderate.  Was advised to start CPAP therapy.  Could not tolerate CPAP therapy.  Using decongestant for sinus congestion as advised by sleep specialist since that may also be playing a role in his obstructive sleep apnea.  Rheumatological review of system negative otherwise.  Physical examination shows no active synovitis.  No effusion.  1. Rheumatoid arthritis involving multiple sites with positive rheumatoid factor    2. Drug-induced immunodeficiency    3. Encounter for medication monitoring    4. Chronic fatigue    5. Severe needle phobia    6. BAUTISTA (obstructive sleep apnea)      Problem List Items Addressed This Visit       Chronic fatigue    Drug-induced immunodeficiency    Encounter for medication monitoring    BAUTISTA (obstructive sleep apnea)    Rheumatoid arthritis involving multiple sites with positive rheumatoid factor - Primary    Relevant Medications    predniSONE (DELTASONE) 2.5 MG tablet    sulfaSALAzine (AZULFIDINE ENTABS) 500 MG EC tablet    upadacitinib (RINVOQ) 15 mg 24 hr tablet    Severe needle phobia    Relevant Medications    upadacitinib (RINVOQ) 15 mg 24 hr tablet       Labs reviewed today:-   Latest Reference Range & Units 03/24/25 10:49   WBC 3.90 - 12.70 K/uL 6.80   RBC 4.60 - 6.20 M/uL 4.61   Hemoglobin 14.0 - 18.0 gm/dL 14.7   Hematocrit 40.0 - 54.0 % 44.0   MCV 82 - 98 fL 95   MCH 27.0 - 50.0 pg 31.9   MCHC 32.0 - 36.0 g/dL 33.4   RDW 11.5 - 14.5 % 12.4   Platelet Count 150 - 450 K/uL 304   MPV 9.2 - 12.9 fL 9.8   Neut % 38 - 73 % 71.1    Lymph % 18 - 48 % 20.6   Mono % 4 - 15 % 7.1   Eos % <=8 % 0.1   Basophil % <=1.9 % 0.7   Immature Granulocytes 0.0 - 0.5 % 0.4   Gran # (ANC) 1.8 - 7.7 K/uL 4.83   Lymph # 1 - 4.8 K/uL 1.40   Mono # 0.3 - 1 K/uL 0.48   Eos # <=0.5 K/uL 0.01   Baso # <=0.2 K/uL 0.05   Immature Grans (Abs) 0.00 - 0.04 K/uL 0.03   nRBC <=0 /100 WBC 0   Sed Rate <=23 mm/hr 11   Sodium 136 - 145 mmol/L 138   Potassium 3.5 - 5.1 mmol/L 3.9   Chloride 95 - 110 mmol/L 103   CO2 23 - 29 mmol/L 22 (L)   Anion Gap 8 - 16 mmol/L 13   BUN 6 - 20 mg/dL 14   Creatinine 0.5 - 1.4 mg/dL 0.9   eGFR >60 mL/min/1.73/m2 >60   Glucose 70 - 110 mg/dL 91   Calcium 8.7 - 10.5 mg/dL 9.8   ALP 40 - 150 unit/L 45   PROTEIN TOTAL 6.0 - 8.4 gm/dL 7.9   Albumin 3.5 - 5.2 g/dL 4.7   BILIRUBIN TOTAL 0.1 - 1.0 mg/dL 1.2 (H)   AST 11 - 45 unit/L 18   ALT 10 - 44 unit/L 12   CRP <=8.2 mg/L 0.7   Hep B Core Total Ab Non-Reactive  Non-Reactive   Hep B S Ab -  Non-Reactive   Hepatitis B Surface Ag Non-Reactive  Non-Reactive   Hepatitis B Surface Antibody Quantitative mIU/mL <3.00   Quantiferon Mitogen Value  1.63   Quantiferon Nil Value  0.01   QuantiFERON-TB Gold Plus Negative  Negative   QuantiFERON TB1 Ag Value  0.03   QuantiFERON TB2 Ag Value  0.01   (L): Data is abnormally low  (H): Data is abnormally high     Latest Reference Range & Units Most Recent   TARIQ Screen Negative <1:80  Negative <1:80  7/11/22 11:51   CCP Antibodies <5.0 U/mL 98.8 (H)  7/21/22 09:37   RBC 4.60 - 6.20 M/uL 4.25 (L)  9/16/24 09:15   Hemoglobin 14.0 - 18.0 g/dL 13.9 (L)  9/16/24 09:15   Hematocrit 40.0 - 54.0 % 41.1  9/16/24 09:15   MCV 82 - 98 fL 97  9/16/24 09:15   RDW 11.5 - 14.5 % 12.1  9/16/24 09:15   Rheumatoid Factor 0.0 - 15.0 IU/mL 340.0 (H)  7/11/22 11:51   (H): Data is abnormally high  (L): Data is abnormally low        Significant improvement of seropositive nonerosive rheumatoid since starting Rinvoq.    Sleep study showed moderate sleep apnea .  Continue follow up with sleep  clinic.   Continue SSZ, Reduce prednisone to 2.5 mg daily. Consider reducing SSZ next visit.   Drug induced immunodeficiency due to use of immunosuppressive drugs. Monitor carefully for infections. Advised to get immediate medical care if any infection. Also advised strict adherence to age appropriate vaccinations and cancer screenings with PCP.  Hold Rinvoq and SSZ  if any infection  Safety labs every visit   I have explained all of the above in detail and the patient understands all of the current recommendation(s). I have answered all questions to the best of my ability and to their complete satisfaction.       # Follow up in about 6 months (around 10/2/2025).      Disclaimer: This note was prepared using voice recognition system and is likely to have sound alike errors and is not proof read.  Please call me with any questions.

## 2025-07-02 ENCOUNTER — LAB VISIT (OUTPATIENT)
Dept: LAB | Facility: HOSPITAL | Age: 30
End: 2025-07-02
Attending: INTERNAL MEDICINE
Payer: COMMERCIAL

## 2025-07-02 DIAGNOSIS — Z79.899 DRUG-INDUCED IMMUNODEFICIENCY: ICD-10-CM

## 2025-07-02 DIAGNOSIS — M05.79 RHEUMATOID ARTHRITIS INVOLVING MULTIPLE SITES WITH POSITIVE RHEUMATOID FACTOR: ICD-10-CM

## 2025-07-02 DIAGNOSIS — Z51.81 ENCOUNTER FOR MEDICATION MONITORING: ICD-10-CM

## 2025-07-02 DIAGNOSIS — D84.821 DRUG-INDUCED IMMUNODEFICIENCY: ICD-10-CM

## 2025-07-02 LAB
ABSOLUTE EOSINOPHIL (OHS): 0.14 K/UL
ABSOLUTE MONOCYTE (OHS): 0.53 K/UL (ref 0.3–1)
ABSOLUTE NEUTROPHIL COUNT (OHS): 2.46 K/UL (ref 1.8–7.7)
ALBUMIN SERPL BCP-MCNC: 4.1 G/DL (ref 3.5–5.2)
ALP SERPL-CCNC: 45 UNIT/L (ref 40–150)
ALT SERPL W/O P-5'-P-CCNC: 18 UNIT/L (ref 10–44)
ANION GAP (OHS): 9 MMOL/L (ref 8–16)
AST SERPL-CCNC: 23 UNIT/L (ref 11–45)
BASOPHILS # BLD AUTO: 0.07 K/UL
BASOPHILS NFR BLD AUTO: 1.2 %
BILIRUB SERPL-MCNC: 0.9 MG/DL (ref 0.1–1)
BUN SERPL-MCNC: 11 MG/DL (ref 6–20)
CALCIUM SERPL-MCNC: 9.2 MG/DL (ref 8.7–10.5)
CHLORIDE SERPL-SCNC: 104 MMOL/L (ref 95–110)
CO2 SERPL-SCNC: 25 MMOL/L (ref 23–29)
CREAT SERPL-MCNC: 0.9 MG/DL (ref 0.5–1.4)
CRP SERPL-MCNC: 1 MG/L
ERYTHROCYTE [DISTWIDTH] IN BLOOD BY AUTOMATED COUNT: 12.4 % (ref 11.5–14.5)
ERYTHROCYTE [SEDIMENTATION RATE] IN BLOOD BY PHOTOMETRIC METHOD: 7 MM/HR
GFR SERPLBLD CREATININE-BSD FMLA CKD-EPI: >60 ML/MIN/1.73/M2
GLUCOSE SERPL-MCNC: 85 MG/DL (ref 70–110)
HCT VFR BLD AUTO: 42.4 % (ref 40–54)
HGB BLD-MCNC: 14 GM/DL (ref 14–18)
IMM GRANULOCYTES # BLD AUTO: 0.02 K/UL (ref 0–0.04)
IMM GRANULOCYTES NFR BLD AUTO: 0.3 % (ref 0–0.5)
LYMPHOCYTES # BLD AUTO: 2.55 K/UL (ref 1–4.8)
MCH RBC QN AUTO: 31.7 PG (ref 27–31)
MCHC RBC AUTO-ENTMCNC: 33 G/DL (ref 32–36)
MCV RBC AUTO: 96 FL (ref 82–98)
NUCLEATED RBC (/100WBC) (OHS): 0 /100 WBC
PLATELET # BLD AUTO: 318 K/UL (ref 150–450)
PMV BLD AUTO: 9.9 FL (ref 9.2–12.9)
POTASSIUM SERPL-SCNC: 4.3 MMOL/L (ref 3.5–5.1)
PROT SERPL-MCNC: 7.6 GM/DL (ref 6–8.4)
RBC # BLD AUTO: 4.41 M/UL (ref 4.6–6.2)
RELATIVE EOSINOPHIL (OHS): 2.4 %
RELATIVE LYMPHOCYTE (OHS): 44.2 % (ref 18–48)
RELATIVE MONOCYTE (OHS): 9.2 % (ref 4–15)
RELATIVE NEUTROPHIL (OHS): 42.7 % (ref 38–73)
SODIUM SERPL-SCNC: 138 MMOL/L (ref 136–145)
WBC # BLD AUTO: 5.77 K/UL (ref 3.9–12.7)

## 2025-07-02 PROCEDURE — 36415 COLL VENOUS BLD VENIPUNCTURE: CPT | Mod: PN

## 2025-07-02 PROCEDURE — 85652 RBC SED RATE AUTOMATED: CPT

## 2025-07-02 PROCEDURE — 82040 ASSAY OF SERUM ALBUMIN: CPT

## 2025-07-02 PROCEDURE — 86140 C-REACTIVE PROTEIN: CPT

## 2025-07-02 PROCEDURE — 85025 COMPLETE CBC W/AUTO DIFF WBC: CPT

## 2025-08-05 DIAGNOSIS — M05.79 RHEUMATOID ARTHRITIS INVOLVING MULTIPLE SITES WITH POSITIVE RHEUMATOID FACTOR: ICD-10-CM

## 2025-08-05 RX ORDER — PREDNISONE 2.5 MG/1
2.5 TABLET ORAL
Qty: 90 TABLET | Refills: 0 | Status: SHIPPED | OUTPATIENT
Start: 2025-08-05